# Patient Record
Sex: FEMALE | Race: OTHER | Employment: FULL TIME | URBAN - METROPOLITAN AREA
[De-identification: names, ages, dates, MRNs, and addresses within clinical notes are randomized per-mention and may not be internally consistent; named-entity substitution may affect disease eponyms.]

---
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
## 2025-01-24 LAB
EXTERNAL CHLAMYDIA SCREEN: NEGATIVE
EXTERNAL GONORRHEA SCREEN: NEGATIVE
EXTERNAL RUBELLA IGG QUANTITATION: POSITIVE
HEPATITIS C VIRUS AB PRESENCE IN SERUM EXTERNAL: NONREACTIVE
HIV 1/ 2 AG/AB SCREEN EXTERNAL: NORMAL

## 2025-01-27 ENCOUNTER — TRANSCRIBE ORDERS (OUTPATIENT)
Dept: ADMINISTRATIVE | Age: 20
End: 2025-01-27

## 2025-01-27 DIAGNOSIS — O30.002: Primary | ICD-10-CM

## 2025-02-12 ENCOUNTER — HOSPITAL ENCOUNTER (OUTPATIENT)
Dept: RADIOLOGY | Facility: CLINIC | Age: 20
Discharge: HOME | End: 2025-02-12

## 2025-02-12 DIAGNOSIS — O30.002 TWIN GESTATION WITH UNKNOWN NUMBER OF PLACENTAS AND AMNIOTIC SACS IN SECOND TRIMESTER (HHS-HCC): ICD-10-CM

## 2025-02-12 PROCEDURE — 76811 OB US DETAILED SNGL FETUS: CPT

## 2025-02-12 PROCEDURE — 76817 TRANSVAGINAL US OBSTETRIC: CPT

## 2025-02-22 ENCOUNTER — DOCUMENTATION (OUTPATIENT)
Dept: OBSTETRICS AND GYNECOLOGY | Facility: HOSPITAL | Age: 20
End: 2025-02-22

## 2025-02-22 PROBLEM — Z3A.21 21 WEEKS GESTATION OF PREGNANCY (HHS-HCC): Status: ACTIVE | Noted: 2025-02-22

## 2025-02-22 PROBLEM — O99.342 DEPRESSION AFFECTING PREGNANCY IN SECOND TRIMESTER, ANTEPARTUM (HHS-HCC): Status: ACTIVE | Noted: 2025-02-22

## 2025-02-22 PROBLEM — F32.A DEPRESSION AFFECTING PREGNANCY IN SECOND TRIMESTER, ANTEPARTUM (HHS-HCC): Status: ACTIVE | Noted: 2025-02-22

## 2025-02-22 PROBLEM — Z3A.20 20 WEEKS GESTATION OF PREGNANCY (HHS-HCC): Status: ACTIVE | Noted: 2025-02-22

## 2025-02-22 PROBLEM — O30.032 MONOCHORIONIC DIAMNIOTIC TWIN GESTATION IN SECOND TRIMESTER (HHS-HCC): Status: ACTIVE | Noted: 2025-02-22

## 2025-02-22 NOTE — PROGRESS NOTES
Pregnancy episode created and dating entered.     Camille Neumann MD, PGY 5  Maternal Fetal Medicine

## 2025-02-22 NOTE — PROGRESS NOTES
2025   Suly Olson     [facility] CONSULT NOTE  Referring Clinician: [facility]   Reason for consult: mo-di twins    HPI: Suly Olson is a 19 y.o.  at 21w3d here for consult for mo-di twins.     She reports that she is feeling well.  She presented to prenatal care around 16 weeks gestation.  She has previously had a first trimester ultrasound with GISSELLE given of 2025.  She reports that she is not taking ASA.     She denies any contractions, bleeding, LOF.  She reports normal fetal movement x 2.    Other pregnancy complications include   Patient Active Problem List   Diagnosis    21 weeks gestation of pregnancy ([facility])    Monochorionic diamniotic twin gestation in second trimester ([facility])    Depression affecting pregnancy in second trimester, antepartum ([facility])       10 point review of system is negative except as above    OB History          1    Para        Term                AB        Living             SAB        IAB        Ectopic        Multiple        Live Births                       Past medical history: Denies HTN, DM, asthma, or thyroid issues    No past surgical history on file.    Medications:   No current outpatient medications on file prior to visit.     No current facility-administered medications on file prior to visit.       No Known Allergies    Social History     Tobacco Use    Smoking status: Never    Smokeless tobacco: Never   Vaping Use    Vaping status: Never Used   Substance Use Topics    Alcohol use: Never    Drug use: Never       family history includes Thyroid disease in her mother.      OBJECTIVE  Visit Vitals  /67   Pulse 97   Wt 71.4 kg (157 lb 8 oz)   OB Status Pregnant   Smoking Status Never       Physical exam  General - in no acute distress, resting comfortably  CV - regular rate   Resp - non labored on room air, normal effort   Abd - gravid, soft, no obvious masses   Skin - no rashes   FHT: Obtained on ultrasound  today    ASSESSMENT & PLAN    Suly Olson is a 19 y.o.  at 21w3d here for the following:    Assessment & Plan  Monochorionic diamniotic twin gestation in second trimester ([facility])  Counseling:   We discussed the implication of monochorionic-diamniotic twin gestation.  We went over expected weight gain with a twin gestation and discussed that we would recommend against routine bedrest.  We discussed that, generally, multiple gestations are associated with an increased risk of most obstetric complications including of pre-term labor, PPROM, pre-eclampsia, gestational diabetes, growth restriction and  delivery.  We discussed that the majority of twins (when all twins are considered more generally) deliver  with the average being 35 weeks gestation.  We reviewed that low dose aspirin is recommended for prevention of preeclampsia. We reviewed the risks of aneuploidy and recommendations for genetic screening. Regarding delivery, we discussed our recommendation for delivery in the 36 weeks if other complications have not arisen due to the increased risk of stillbirth after that gestational age.  We reviewed the preferred route of delivery for twin gestation is a vaginal delivery though this is dependent on fetal presentation.  We reviewed that if Baby A was breech we would recommend a  section and if both fetuses are vertex we would recommend against a  section.  We discussed the option of breech extraction for Baby B if Baby A was vertex and Baby B was not and that in a large randomized study of attempted vaginal vs planned  delivery of twins between 32 and 39 weeks there was no significant decrease or increase in the risk of fetal or  death or serious  morbidity between the groups.  However, we did note that the option of breech extraction may depend on the comfort of both the patient and her provider at the time of delivery.    In regard to  monochorionic-diamniotic gestations we reviewed the risk of twin-twin transfusion syndrome in approximately 10% of pregnancies and need for close surveillance with biweekly assessment starting at 16 weeks. Given these heightened risks I did discuss that I would recommend management by maternal fetal medicine for prenatal care.    Recommendations:  -Genetic screening discussed and offered. Genetics referral placed   -Fetal echos to be performed. Appointment scheduled for 3/10  -TTTS screening every 2 weeks. US today WNL  -Growth ultrasounds q4 weeks   21 weeks gestation of pregnancy ([facility])  -Up-to-date on prenatal care  -Discussed need for GDM screening at next visit    Thank you for allowing us to participate in the care of your patient. Given her medical complexity we will transfer her care to our service.    RTC in 4 weeks     Patient was seen and evaluated with Dr. Travis.    Camille Neumann MD   Maternal Fetal Medicine

## 2025-02-22 NOTE — ASSESSMENT & PLAN NOTE
Counseling:   We discussed the implication of monochorionic-diamniotic twin gestation.  We went over expected weight gain with a twin gestation and discussed that we would recommend against routine bedrest.  We discussed that, generally, multiple gestations are associated with an increased risk of most obstetric complications including of pre-term labor, PPROM, pre-eclampsia, gestational diabetes, growth restriction and  delivery.  We discussed that the majority of twins (when all twins are considered more generally) deliver  with the average being 35 weeks gestation.  We reviewed that low dose aspirin is recommended for prevention of preeclampsia. We reviewed the risks of aneuploidy and recommendations for genetic screening. Regarding delivery, we discussed our recommendation for delivery in the 36 weeks if other complications have not arisen due to the increased risk of stillbirth after that gestational age.  We reviewed the preferred route of delivery for twin gestation is a vaginal delivery though this is dependent on fetal presentation.  We reviewed that if Baby A was breech we would recommend a  section and if both fetuses are vertex we would recommend against a  section.  We discussed the option of breech extraction for Baby B if Baby A was vertex and Baby B was not and that in a large randomized study of attempted vaginal vs planned  delivery of twins between 32 and 39 weeks there was no significant decrease or increase in the risk of fetal or  death or serious  morbidity between the groups.  However, we did note that the option of breech extraction may depend on the comfort of both the patient and her provider at the time of delivery.    In regard to monochorionic-diamniotic gestations we reviewed the risk of twin-twin transfusion syndrome in approximately 10% of pregnancies and need for close surveillance with biweekly assessment starting at 16 weeks.  Given these heightened risks I did discuss that I would recommend management by maternal fetal medicine for prenatal care.    Recommendations:  -Genetic screening discussed and offered. Genetics referral placed   -Fetal echos to be performed. Appointment scheduled for 3/10  -TTTS screening every 2 weeks. US today WNL  -Growth ultrasounds q4 weeks

## 2025-02-26 ENCOUNTER — HOSPITAL ENCOUNTER (OUTPATIENT)
Dept: RADIOLOGY | Facility: CLINIC | Age: 20
Discharge: HOME | End: 2025-02-26

## 2025-02-26 ENCOUNTER — INITIAL PRENATAL (OUTPATIENT)
Dept: MATERNAL FETAL MEDICINE | Facility: CLINIC | Age: 20
End: 2025-02-26

## 2025-02-26 ENCOUNTER — TELEPHONE (OUTPATIENT)
Dept: GENETICS | Facility: CLINIC | Age: 20
End: 2025-02-26

## 2025-02-26 VITALS — WEIGHT: 157.5 LBS | HEART RATE: 97 BPM | DIASTOLIC BLOOD PRESSURE: 67 MMHG | SYSTOLIC BLOOD PRESSURE: 111 MMHG

## 2025-02-26 DIAGNOSIS — O30.032 MONOCHORIONIC DIAMNIOTIC TWIN GESTATION IN SECOND TRIMESTER (HHS-HCC): ICD-10-CM

## 2025-02-26 DIAGNOSIS — Z3A.21 21 WEEKS GESTATION OF PREGNANCY (HHS-HCC): ICD-10-CM

## 2025-02-26 DIAGNOSIS — O30.002 TWIN GESTATION WITH UNKNOWN NUMBER OF PLACENTAS AND AMNIOTIC SACS IN SECOND TRIMESTER (HHS-HCC): ICD-10-CM

## 2025-02-26 DIAGNOSIS — O30.032 MONOCHORIONIC DIAMNIOTIC TWIN GESTATION IN SECOND TRIMESTER (HHS-HCC): Primary | ICD-10-CM

## 2025-02-26 PROCEDURE — 99214 OFFICE O/P EST MOD 30 MIN: CPT | Mod: GC | Performed by: STUDENT IN AN ORGANIZED HEALTH CARE EDUCATION/TRAINING PROGRAM

## 2025-02-26 PROCEDURE — 99204 OFFICE O/P NEW MOD 45 MIN: CPT | Performed by: STUDENT IN AN ORGANIZED HEALTH CARE EDUCATION/TRAINING PROGRAM

## 2025-02-26 PROCEDURE — 76816 OB US FOLLOW-UP PER FETUS: CPT

## 2025-02-26 PROCEDURE — 76821 MIDDLE CEREBRAL ARTERY ECHO: CPT

## 2025-02-26 ASSESSMENT — PAIN - FUNCTIONAL ASSESSMENT: PAIN_FUNCTIONAL_ASSESSMENT: 0-10

## 2025-02-26 ASSESSMENT — EDINBURGH POSTNATAL DEPRESSION SCALE (EPDS)
I HAVE BEEN SO UNHAPPY THAT I HAVE HAD DIFFICULTY SLEEPING: NOT AT ALL
THINGS HAVE BEEN GETTING ON TOP OF ME: NO, I HAVE BEEN COPING AS WELL AS EVER
I HAVE BEEN SO UNHAPPY THAT I HAVE BEEN CRYING: NO, NEVER
I HAVE BLAMED MYSELF UNNECESSARILY WHEN THINGS WENT WRONG: NO, NEVER
I HAVE LOOKED FORWARD WITH ENJOYMENT TO THINGS: AS MUCH AS I EVER DID
TOTAL SCORE: 0
I HAVE FELT SAD OR MISERABLE: NO, NOT AT ALL
I HAVE FELT SCARED OR PANICKY FOR NO GOOD REASON: NO, NOT AT ALL
I HAVE BEEN ABLE TO LAUGH AND SEE THE FUNNY SIDE OF THINGS: AS MUCH AS I ALWAYS COULD
THE THOUGHT OF HARMING MYSELF HAS OCCURRED TO ME: NEVER
I HAVE BEEN ANXIOUS OR WORRIED FOR NO GOOD REASON: NO, NOT AT ALL

## 2025-02-26 ASSESSMENT — PATIENT HEALTH QUESTIONNAIRE - PHQ9
2. FEELING DOWN, DEPRESSED OR HOPELESS: NOT AT ALL
SUM OF ALL RESPONSES TO PHQ9 QUESTIONS 1 AND 2: 0
1. LITTLE INTEREST OR PLEASURE IN DOING THINGS: NOT AT ALL

## 2025-03-10 ENCOUNTER — HOSPITAL ENCOUNTER (OUTPATIENT)
Dept: PEDIATRIC CARDIOLOGY | Facility: HOSPITAL | Age: 20
Discharge: HOME | End: 2025-03-10

## 2025-03-10 ENCOUNTER — HOSPITAL ENCOUNTER (OUTPATIENT)
Dept: RADIOLOGY | Facility: CLINIC | Age: 20
Discharge: HOME | End: 2025-03-10

## 2025-03-10 ENCOUNTER — OFFICE VISIT (OUTPATIENT)
Dept: PEDIATRIC CARDIOLOGY | Facility: HOSPITAL | Age: 20
End: 2025-03-10

## 2025-03-10 VITALS
HEIGHT: 62 IN | HEART RATE: 96 BPM | DIASTOLIC BLOOD PRESSURE: 75 MMHG | BODY MASS INDEX: 28.8 KG/M2 | OXYGEN SATURATION: 99 % | SYSTOLIC BLOOD PRESSURE: 112 MMHG | WEIGHT: 156.53 LBS

## 2025-03-10 DIAGNOSIS — O35.9XX1 SUSPECTED FETAL ANOMALY, ANTEPARTUM, FETUS 1 (HHS-HCC): ICD-10-CM

## 2025-03-10 DIAGNOSIS — O35.8XX0 MATERNAL CARE FOR OTHER (SUSPECTED) FETAL ABNORMALITY AND DAMAGE, NOT APPLICABLE OR UNSPECIFIED (HHS-HCC): ICD-10-CM

## 2025-03-10 DIAGNOSIS — O30.002 TWIN GESTATION WITH UNKNOWN NUMBER OF PLACENTAS AND AMNIOTIC SACS IN SECOND TRIMESTER (HHS-HCC): ICD-10-CM

## 2025-03-10 DIAGNOSIS — O35.9XX2: ICD-10-CM

## 2025-03-10 DIAGNOSIS — O30.039 TWIN PREGNANCY, MONOCHORIONIC/DIAMNIOTIC, UNSPECIFIED TRIMESTER (HHS-HCC): ICD-10-CM

## 2025-03-10 DIAGNOSIS — O35.9XX1 SUSPECTED FETAL ABNORMALITY AFFECTING MANAGEMENT OF MOTHER, FETUS 1 OF MULTIPLE GESTATION (HHS-HCC): ICD-10-CM

## 2025-03-10 DIAGNOSIS — O30.032 MONOCHORIONIC DIAMNIOTIC TWIN GESTATION IN SECOND TRIMESTER (HHS-HCC): Primary | ICD-10-CM

## 2025-03-10 PROCEDURE — 99214 OFFICE O/P EST MOD 30 MIN: CPT | Mod: 25 | Performed by: PEDIATRICS

## 2025-03-10 PROCEDURE — 1036F TOBACCO NON-USER: CPT | Performed by: PEDIATRICS

## 2025-03-10 PROCEDURE — 76816 OB US FOLLOW-UP PER FETUS: CPT

## 2025-03-10 PROCEDURE — 76821 MIDDLE CEREBRAL ARTERY ECHO: CPT | Performed by: OBSTETRICS & GYNECOLOGY

## 2025-03-10 PROCEDURE — 76816 OB US FOLLOW-UP PER FETUS: CPT | Performed by: OBSTETRICS & GYNECOLOGY

## 2025-03-10 PROCEDURE — 99204 OFFICE O/P NEW MOD 45 MIN: CPT | Performed by: PEDIATRICS

## 2025-03-10 PROCEDURE — 3008F BODY MASS INDEX DOCD: CPT | Performed by: PEDIATRICS

## 2025-03-10 NOTE — PROGRESS NOTES
[facility]  [facility]  Division of Pediatric Cardiology  [facility]  [address], [place] [address]  Tel: [phone], Fax [phone]      Obstetrician: Dr. Camille Neumann/ Dr. Koko Travis     Suly Olson was seen at the request of  Dr. Sasha Almaraz  for  monochorionic diamniotic twin gestation .  Records were reviewed, and a summary of those records is integrated within the history of present illness.  A report with my findings is being sent via written or electronic means to the referring physician with my recommendations.    Accompanied by: significant other  History obtained from: patient    History of Presentation   History of Present Illness:   Suly Olson is a 19 y.o. female presenting for initial prenatal cardiology consultation and fetal echocardiogram for  monochorionic diamniotic twin gestation .  She is  and approximately 23w1d weeks pregnant (No LMP recorded. Patient is pregnant., Estimated Date of Delivery: 25) with two Male fetuses.  Her obstetric history is significant for first pregnancy.  Complications of her current pregnancy include  twin gestation .  Ms. Suly Olson had a normal first trimester screen.  Her level 2 obstetric ultrasound for anatomy was performed at 19 weeks gestational age and demonstrated incomplete cardiac views for Twin A and Twin B. In addition there was concern for a discrepancy in pulmonary artery to aorta size in Twin A. Her obstetric ultrasound at 21 weeks was completed and no malformations were identified with reassuring cardiac views.  She has not undergone cell free fetal DNA testing.  She has not had invasive genetic testing such as amniocentesis or chorionic villous sampling.  This pregnancy was not the result of in vitro fertilization.  She was not using potentially teratogenic medication at the time of  "conception.  There have been no other complications of this pregnancy.  Ms. Suly Olson plans to deliver her baby at [facility].    Ms. Suly Olson feels well today.  She denies any shortness of breath, abdominal cramping, contractions, bleeding, or swelling of the extremities.  She notes frequent fetal movement.      Medical History     Medical Conditions:  Patient Active Problem List   Diagnosis    21 weeks gestation of pregnancy ([facility])    Monochorionic diamniotic twin gestation in second trimester ([facility])    Depression affecting pregnancy in second trimester, antepartum ([facility])     Past Surgeries:  No past surgical history on file.    No current outpatient medications   Allergies:  Patient has no known allergies.    Social History:  Patient lives with significant other.  Occupation: None   Smoking:  Yes   Alcohol: None  Drug Use: None  She wears a seatbelt while in the car. She denies any verbal, sexual or physical abuse.     Cardiac Family History (for patient and father of baby):  She has a brother who  from SIDS.  There is no history of congenital heart disease.  There is no history of early sudden drowning.  There is no history of cardiomyopathy of any type or heart transplant.  There is no history of arrhythmias/pacemaker/defibrillator or arrhythmia syndromes, including Long QT syndrome, Ghanshyam-Parkinson-White syndrome or Brugada syndrome.  There is no history of heart attack or stroke before the age of 55 years in a close family member.  There is no history of Marfan syndrome or aortic aneurysm.  There is no history of deafness.  There is no history of syncope/fainting.  There is no history of high blood pressure or high cholesterol.  There is no history of DiGeorge Syndrome (22q11).    Physical Examination     /75 (BP Location: Left arm, Patient Position: Sitting, BP Cuff Size: Adult)   Pulse 96   Ht 1.576 m (5' 2.05\")   Wt 71 kg (156 lb 8.4 oz)   SpO2 99%   BMI " 28.59 kg/m²     General: Alert, well-appearing and in no acute distress.    Abdomen: Soft, nontender, not distended. Gravid.  Extremities: No swelling or edema.  Neurologic / Psychiatric: Grossly intact without focal deficits.  Appropriate demeanor.      Results     Fetal Echocardiogram:    I ordered and interpreted a transabdominal fetal echocardiogram.  I performed a portion of the study myself.  The complete report is available under separate cover.  The results are summarized as follows:    Fetus A:  Image quality: Good  Cardiac situs: Cardiac mass in the left chest.  Left ventricular apex points leftward.  Segmental anatomy: Atrio-ventricular concordance.  Ventriculo-arterial concordance.  Normally-related great arteries.  Superior vena cava: Normal connection to the right atrium.  Inferior vena cava: Normal connection to the right atrium.  Pulmonary veins: At least one pulmonary vein connects normally to the left atrium.  Atrial septum: Patent foramen ovale, with flap valve bowing into the left atrium.  Tricuspid valve: Structurally normal.  No obvious stenosis or insufficiency.  Mitral valve: Structurally normal.  No obvious stenosis or insufficiency.  Right ventricle: Normal ventricular size, wall thickness, and systolic function (qualitative).  Left ventricle: Normal ventricular size, wall thickness, and systolic function (qualitative).  Interventricular septum: No obvious septal defect.  Aortic valve: Structurally normal.  No obvious stenosis or insufficiency.  Pulmonary valve: Structurally normal.  No obvious stenosis or insufficiency.  Pulmonary artery: Normal in size.  Ductal arch: Patent with right to left shunting.  Aortic arch: Left-sided.  Patent.  Pericardial effusion: None.  Umbilical arteries: Two umbilical arteries.  Normal arterial flow pattern.  Umbilical vein: Normal venous flow pattern.  Electrophysiology: Normal fetal heart rate and rhythm.  Normal mechanical MA interval.    Fetus A:  Image  quality: Good  Cardiac situs: Cardiac mass in the left chest.  Left ventricular apex points leftward.  Segmental anatomy: Atrio-ventricular concordance.  Ventriculo-arterial concordance.  Normally-related great arteries.  Superior vena cava: Normal connection to the right atrium.  Inferior vena cava: Normal connection to the right atrium.  Pulmonary veins: At least one pulmonary vein connects normally to the left atrium.  Atrial septum: Patent foramen ovale, with flap valve bowing into the left atrium.  Tricuspid valve: Structurally normal.  No obvious stenosis or insufficiency.  Mitral valve: Structurally normal.  No obvious stenosis or insufficiency.  Right ventricle: Normal ventricular size, wall thickness, and systolic function (qualitative).  Left ventricle: Normal ventricular size, wall thickness, and systolic function (qualitative).  Interventricular septum: No obvious septal defect.  Aortic valve: Structurally normal.  No obvious stenosis or insufficiency.  Pulmonary valve: Structurally normal.  No obvious stenosis or insufficiency.  Pulmonary artery: Normal in size.  Ductal arch: Patent with right to left shunting.  Aortic arch: Left-sided.  Patent.  Pericardial effusion: None.  Umbilical arteries: Two umbilical arteries.  Normal arterial flow pattern.  Umbilical vein: Normal venous flow pattern.  Electrophysiology: Normal fetal heart rate and rhythm.  Normal mechanical MO interval.    Assessment & Plan   Assessment:  Ms. Suly Olson is a 19 y.o. female who is  and currently at 23w1d weeks gestational age with twin male fetuses.  A fetal echocardiogram was performed today for  monochorionic diamniotic twin gestation .     Fetal echocardiogram today demonstrated grossly normal fetal cardiac anatomy, qualitatively normal fetal cardiac function, normal fetal heart rhythm, and no evidence of in utero congestive heart failure or hydrops fetalis for both fetus A and B.  I reviewed the results of today's  evaluation, including the findings of the fetal echocardiogram, with Ms. Suly Olson in detail.      I discussed limitations of the technology of fetal echocardiography with Ms. Suly Olson in detail.  I explained that fetal echocardiography cannot exclude all forms of congenital heart disease.  Fetal echocardiography may be insensitive to some defects of atrial and ventricular septation, minor valvular abnormalities, partial anomalous pulmonary venous return, and coarctation of the aorta.  In addition, normal fetal echocardiogram does not ensure that the fetal ductus arteriosus or foramen ovale will close.      Recommendations:  No changes to prenatal care.    Further fetal cardiac imaging is not required at this time.  We would be happy to see Ms. Suly Olson in the future if new concerns arise.    Fetus A and B:  Triage code 0:        No changes to delivery planning.  Delivery per obstetrics at patient´s preferred hospital.  Standard  care per  team.        No pediatric cardiology consult needed after birth unless there are concerns/issues.    I spent greater than 60 minutes in performance of this consultation, of which greater than 50% was related to coordination of care or counseling.    This assessment and plan, in addition to the results of relevant testing were explained to Suly. All questions were answered and understanding was demonstrated.    It was a pleasure to see Ms. Suly Olson today.  If you have any questions or concerns regarding this evaluation, do not hesitate to contact me.      Vane Shah MD, FACC, FAAP   of Pediatrics  Division of Pediatric Cardiology  [place] [address]  Phone: [phone]  Fax: [phone]  e-mail: [email or web address]

## 2025-03-10 NOTE — PATIENT INSTRUCTIONS
On fetal echocardiogram today the structure, size, function (squeeze), and rhythm of both of your fetuses' hearts were normal.  There are some limitations to fetal echocardiogram as described below, and because it is not a perfect test it is important to know that we cannot rule out all possible heart problems (see below).  We will communicate these results with your obstetrician.    It was a pleasure to see you today.  We do not need to see you back for routine follow-up during the remainder of your pregnancy.  However, we would be happy to see you back if new issues or concerns arise.  After birth your babies crocker not need to see a cardiologist unless the pediatric team has concerns.  If you have any questions or concerns regarding this evaluation, please do not hesitate to contact me.    Vane Shah MD   of Pediatrics  Division of Pediatric Cardiology  [place] [address]  Phone: [phone]  Fax: [phone]  e-mail: [email or web address]    [email or web address]    Normal Fetal Echocardiogram    Today you had an ultrasound of your fetus' heart (fetal echocardiogram).  Based on all of the pictures taken today, the heart appears normal and is developing as expected for this point in your pregnancy.   Therefore, no further testing is recommended at this time.    Despite today´s normal findings, it is impossible to rule out all heart problems before birth.  This is partially because the fetus´ heart is so small, and our machine´s technology can only see structures down to a certain size.  It is also because blood flow in a fetus is different and more complicated than blood flow after birth.    Briefly:  ° Fetuses get oxygen from the placenta instead of their lungs.  High-oxygen (red) blood from the placenta comes back to the right side of the  fetus´ heart.  ° Red blood crosses to the left side of the heart through a hole between the upper chambers of the heart, the foramen ovale.  The foramen ovale lets the red blood flow to the body.  ° Low-oxygen (blue) blood stays on the right side of the heart.  After leaving the heart, the blue blood flows through a special blood vessel known as the ductus arteriosus.  The ductus arteriosus allows the blue blood to bypass the lungs and return to the placenta to get oxygen.  ° After birth the foramen ovale and ductus arteriosus should close, but sometimes they do not.  It is impossible to predict in which children these structures will remain open.    ° Thus, on fetal echo we cannot rule out that your baby will have a patent foramen ovale (PFO) or patent ductus arteriosus (PDA) after birth.    In addition, fetal echocardiography can miss other heart defects including:  ° Small or medium-sized holes between the upper or lower heart chambers.  ° Minor abnormalities of the heart valves.  ° Narrowing of the main artery that goes to the body (coarctation of the aorta).  ° Other rare abnormalities of the veins or arteries.    If any of these defects are present, there should be findings after birth that will alert your child´s doctor that there is a problem and prompt further evaluation.  After delivery, if your pediatrician has any concerns, your child's heart can be reevaluated at that time.

## 2025-03-10 NOTE — LETTER
March 10, 2025     Sasha Almaraz MD   Robbie Ferrer  [facility] [address]  [facility] [address]    Patient: Suly Olson   YOB: 2005   Date of Visit: 3/10/2025       Dear Dr. Sasha Almaraz MD:    Thank you for referring Suly Olson to me for evaluation. Below are my notes for this consultation.  If you have questions, please do not hesitate to call me. I look forward to following your patient along with you.       Sincerely,     Vane Shah MD      CC: MD Marley Rayo, APRN-CNP  Isabel Navarro, APRROBERT-CN, DNP  ______________________________________________________________________________________         [facility]  [facility]  Division of Pediatric Cardiology  [facility]  [address] [place] [address], [address] [place] [address]  Tel: [phone], Fax [phone]      Obstetrician: Dr. Camille Neumann/ Dr. Koko Travis     Suly Olson was seen at the request of  Dr. Sasha Almaraz  for  monochorionic diamniotic twin gestation .  Records were reviewed, and a summary of those records is integrated within the history of present illness.  A report with my findings is being sent via written or electronic means to the referring physician with my recommendations.    Accompanied by: significant other  History obtained from: patient    History of Presentation   History of Present Illness:   Suly Olson is a 19 y.o. female presenting for initial prenatal cardiology consultation and fetal echocardiogram for  monochorionic diamniotic twin gestation .  She is  and approximately 23w1d weeks pregnant (No LMP recorded. Patient is pregnant., Estimated Date of Delivery: 25) with two Male fetuses.  Her obstetric history is significant for first pregnancy.  Complications of her current pregnancy include  twin gestation .  Ms. Suly Olson had a normal  first trimester screen.  Her level 2 obstetric ultrasound for anatomy was performed at 19 weeks gestational age and demonstrated incomplete cardiac views for Twin A and Twin B. In addition there was concern for a discrepancy in pulmonary artery to aorta size in Twin A. Her obstetric ultrasound at 21 weeks was completed and no malformations were identified with reassuring cardiac views.  She has not undergone cell free fetal DNA testing.  She has not had invasive genetic testing such as amniocentesis or chorionic villous sampling.  This pregnancy was not the result of in vitro fertilization.  She was not using potentially teratogenic medication at the time of conception.  There have been no other complications of this pregnancy.  Ms. Suly Olson plans to deliver her baby at [facility].    Ms. Suly Olson feels well today.  She denies any shortness of breath, abdominal cramping, contractions, bleeding, or swelling of the extremities.  She notes frequent fetal movement.      Medical History     Medical Conditions:  Patient Active Problem List   Diagnosis   • 21 weeks gestation of pregnancy ([facility])   • Monochorionic diamniotic twin gestation in second trimester ([facility])   • Depression affecting pregnancy in second trimester, antepartum ([facility])     Past Surgeries:  No past surgical history on file.    No current outpatient medications   Allergies:  Patient has no known allergies.    Social History:  Patient lives with significant other.  Occupation: None   Smoking:  Yes   Alcohol: None  Drug Use: None  She wears a seatbelt while in the car. She denies any verbal, sexual or physical abuse.     Cardiac Family History (for patient and father of baby):  She has a brother who  from SIDS.  There is no history of congenital heart disease.  There is no history of early sudden drowning.  There is no history of cardiomyopathy of any type or heart transplant.  There is no history of  "arrhythmias/pacemaker/defibrillator or arrhythmia syndromes, including Long QT syndrome, Ghanshyam-Parkinson-White syndrome or Brugada syndrome.  There is no history of heart attack or stroke before the age of 55 years in a close family member.  There is no history of Marfan syndrome or aortic aneurysm.  There is no history of deafness.  There is no history of syncope/fainting.  There is no history of high blood pressure or high cholesterol.  There is no history of DiGeorge Syndrome (22q11).    Physical Examination     /75 (BP Location: Left arm, Patient Position: Sitting, BP Cuff Size: Adult)   Pulse 96   Ht 1.576 m (5' 2.05\")   Wt 71 kg (156 lb 8.4 oz)   SpO2 99%   BMI 28.59 kg/m²     General: Alert, well-appearing and in no acute distress.    Abdomen: Soft, nontender, not distended. Gravid.  Extremities: No swelling or edema.  Neurologic / Psychiatric: Grossly intact without focal deficits.  Appropriate demeanor.      Results     Fetal Echocardiogram:    I ordered and interpreted a transabdominal fetal echocardiogram.  I performed a portion of the study myself.  The complete report is available under separate cover.  The results are summarized as follows:    Fetus A:  Image quality: Good  Cardiac situs: Cardiac mass in the left chest.  Left ventricular apex points leftward.  Segmental anatomy: Atrio-ventricular concordance.  Ventriculo-arterial concordance.  Normally-related great arteries.  Superior vena cava: Normal connection to the right atrium.  Inferior vena cava: Normal connection to the right atrium.  Pulmonary veins: At least one pulmonary vein connects normally to the left atrium.  Atrial septum: Patent foramen ovale, with flap valve bowing into the left atrium.  Tricuspid valve: Structurally normal.  No obvious stenosis or insufficiency.  Mitral valve: Structurally normal.  No obvious stenosis or insufficiency.  Right ventricle: Normal ventricular size, wall thickness, and systolic function " (qualitative).  Left ventricle: Normal ventricular size, wall thickness, and systolic function (qualitative).  Interventricular septum: No obvious septal defect.  Aortic valve: Structurally normal.  No obvious stenosis or insufficiency.  Pulmonary valve: Structurally normal.  No obvious stenosis or insufficiency.  Pulmonary artery: Normal in size.  Ductal arch: Patent with right to left shunting.  Aortic arch: Left-sided.  Patent.  Pericardial effusion: None.  Umbilical arteries: Two umbilical arteries.  Normal arterial flow pattern.  Umbilical vein: Normal venous flow pattern.  Electrophysiology: Normal fetal heart rate and rhythm.  Normal mechanical SC interval.    Fetus A:  Image quality: Good  Cardiac situs: Cardiac mass in the left chest.  Left ventricular apex points leftward.  Segmental anatomy: Atrio-ventricular concordance.  Ventriculo-arterial concordance.  Normally-related great arteries.  Superior vena cava: Normal connection to the right atrium.  Inferior vena cava: Normal connection to the right atrium.  Pulmonary veins: At least one pulmonary vein connects normally to the left atrium.  Atrial septum: Patent foramen ovale, with flap valve bowing into the left atrium.  Tricuspid valve: Structurally normal.  No obvious stenosis or insufficiency.  Mitral valve: Structurally normal.  No obvious stenosis or insufficiency.  Right ventricle: Normal ventricular size, wall thickness, and systolic function (qualitative).  Left ventricle: Normal ventricular size, wall thickness, and systolic function (qualitative).  Interventricular septum: No obvious septal defect.  Aortic valve: Structurally normal.  No obvious stenosis or insufficiency.  Pulmonary valve: Structurally normal.  No obvious stenosis or insufficiency.  Pulmonary artery: Normal in size.  Ductal arch: Patent with right to left shunting.  Aortic arch: Left-sided.  Patent.  Pericardial effusion: None.  Umbilical arteries: Two umbilical arteries.   Normal arterial flow pattern.  Umbilical vein: Normal venous flow pattern.  Electrophysiology: Normal fetal heart rate and rhythm.  Normal mechanical NV interval.    Assessment & Plan   Assessment:  Ms. Suly Olson is a 19 y.o. female who is  and currently at 23w1d weeks gestational age with twin male fetuses.  A fetal echocardiogram was performed today for  monochorionic diamniotic twin gestation .     Fetal echocardiogram today demonstrated grossly normal fetal cardiac anatomy, qualitatively normal fetal cardiac function, normal fetal heart rhythm, and no evidence of in utero congestive heart failure or hydrops fetalis for both fetus A and B.  I reviewed the results of today's evaluation, including the findings of the fetal echocardiogram, with Ms. Suly Olson in detail.      I discussed limitations of the technology of fetal echocardiography with Ms. Suly Olson in detail.  I explained that fetal echocardiography cannot exclude all forms of congenital heart disease.  Fetal echocardiography may be insensitive to some defects of atrial and ventricular septation, minor valvular abnormalities, partial anomalous pulmonary venous return, and coarctation of the aorta.  In addition, normal fetal echocardiogram does not ensure that the fetal ductus arteriosus or foramen ovale will close.      Recommendations:  No changes to prenatal care.    Further fetal cardiac imaging is not required at this time.  We would be happy to see Ms. Suly Olson in the future if new concerns arise.    Fetus A and B:  Triage code 0:        No changes to delivery planning.  Delivery per obstetrics at patient´s preferred hospital.  Standard  care per  team.        No pediatric cardiology consult needed after birth unless there are concerns/issues.    I spent greater than 60 minutes in performance of this consultation, of which greater than 50% was related to coordination of care or counseling.    This assessment and plan,  in addition to the results of relevant testing were explained to Suly. All questions were answered and understanding was demonstrated.    It was a pleasure to see Ms. Suly Olson today.  If you have any questions or concerns regarding this evaluation, do not hesitate to contact me.      Vane Shah MD, FACC, FAAP   of Pediatrics  Division of Pediatric Cardiology  [place] [address]  Phone: [phone]  Fax: [phone]  e-mail: [email or web address]

## 2025-03-18 ENCOUNTER — CLINICAL SUPPORT (OUTPATIENT)
Dept: GENETICS | Facility: HOSPITAL | Age: 20
End: 2025-03-18

## 2025-03-18 VITALS
DIASTOLIC BLOOD PRESSURE: 68 MMHG | SYSTOLIC BLOOD PRESSURE: 112 MMHG | HEART RATE: 105 BPM | BODY MASS INDEX: 28.89 KG/M2 | WEIGHT: 153 LBS | HEIGHT: 61 IN

## 2025-03-18 DIAGNOSIS — O30.032 MONOCHORIONIC DIAMNIOTIC TWIN GESTATION IN SECOND TRIMESTER (HHS-HCC): ICD-10-CM

## 2025-03-18 DIAGNOSIS — Z13.79 GENETIC SCREENING: ICD-10-CM

## 2025-03-18 PROCEDURE — GENMD PR GENETICS VISIT (MEDICAID/MEDICARE): Performed by: GENETIC COUNSELOR, MS

## 2025-03-25 PROBLEM — Z3A.25 25 WEEKS GESTATION OF PREGNANCY (HHS-HCC): Status: ACTIVE | Noted: 2025-02-22

## 2025-03-25 NOTE — ASSESSMENT & PLAN NOTE
-third trimester labs ordered today. Encouraged patient to complete near 28wks gestation   -reviewed cfDNA results   -US today. Continue q2 week ultrasounds for TTTS checks   -Tdap at next visit   -Birth control - undecided. Currently in same sex relationship   -Plans to breastfeed. Will arrange breast pump  -Undecided on pediatrician     Orders:    CBC Anemia Panel With Reflex, Pregnancy; Future    Syphilis Screen with Reflex; Future    Glucose, 1 Hour Screen, Pregnancy; Future    Follow Up In Maternal Fetal Medicine; Future

## 2025-03-25 NOTE — PROGRESS NOTES
MFM Follow-up  3/26/2025   2:19 PM     SUBJECTIVE    HPI: Suly Olson is a 19 y.o.  at 25w3d  here for RPNV. Denies contractions, bleeding, or LOF. Reports normal fetal movement. She reports that she does not have an appetite though is not nauseous or having     Since last being seen in clinic, she underwent fetal echo which was normal for both twins and was seen by genetic counseling where she opted for Tab cfDNA screening. cfDNA was low risk with monozygotic twins.     OBJECTIVE    Visit Vitals  /83 Comment: 108   Wt 70.7 kg (155 lb 12.8 oz)   BMI 29.44 kg/m²   OB Status Pregnant   Smoking Status Never   BSA 1.74 m²      General - in no acute distress, resting comfortably  CV - regular rate   Resp - non labored on room air, normal effort   Abd - gravid, soft, no obvious masses   Skin - no rashes   FHT: obtained on US today    ASSESSMENT & PLAN    Suly Olson is a 19 y.o.  at 25w3d  here for the following concerns we addressed today:    Assessment & Plan  Monochorionic diamniotic twin gestation in second trimester ([facility])  - US today with negative TTTS screen   -s/p fetal echo, normal for both twins        25 weeks gestation of pregnancy ([facility])  -third trimester labs ordered today. Encouraged patient to complete near 28wks gestation   -reviewed cfDNA results   -US today. Continue q2 week ultrasounds for TTTS checks   -Tdap at next visit   -Birth control - undecided. Currently in same sex relationship   -Plans to breastfeed. Will arrange breast pump  -Undecided on pediatrician     Orders:    CBC Anemia Panel With Reflex, Pregnancy; Future    Syphilis Screen with Reflex; Future    Glucose, 1 Hour Screen, Pregnancy; Future    Follow Up In Maternal Fetal Medicine; Future      RTC in 4 weeks    Patient seen and evaluated with Dr. Ortiz.     Camille Neumann MD

## 2025-03-25 NOTE — PROGRESS NOTES
"Suly Olson is a 19 y.o. old, , female who was approximately 24 weeks and 2 days pregnant at the time of our appointment with an EDC of 25.  She was seen with her grandmother to discuss her genetic screening and testing options.    PAST HISTORY:  The patient has a personal history of depression.  The patient is currently pregnancy with a Monochorionic/diamniotic twin gestation.  The patient reported that the father of the pregnancy is currently incarcerated and she does not have contact with him.  He is not planning on being involved with this child after birth.  The patient reported that she is smoking marijuana daily.      Ultrasound 3/10/25, per report:  \"-Monochorionic/diamniotic twin gestation noted  -Normal concordant growth of both twins  -Normal limited anatomic surveys of both twins  -There is no evidence of twin to twin transfusion syndrome by comparison of, amniotic fluid volumes, and bladder sizes.  - There is no evidence of TAPS by PSV of the MCA of either twin...\"    Ultrasound 25, per report:  \"-Monochorionic/diamniotic twin gestation noted  -Fetal biometry was not repeated due to the short scan interval  -The anatomic survey was completed x2  -The 3VV for Twin A appears normal on today's exam (there was concern for a size discrepancy (A>P) at the targeted exam)  -No malformations were identified  -There is no evidence of twin to twin transfusion syndrome by comparison of amniotic fluid volumes and bladder sizes.  -There is no evidence of twin anemia/polycythemia sequence (TAPS) by comparison of MCA PSVs...\"    Ultrasound 25, per report:  \"-Monochorionic/diamniotic twin gestation noted: one placental mass is noted, the intertwin membrane appears thin without lambda sign, and fetal sex is concordant  -Normal and concordant interval fetal growth of both twins  -Detailed anatomic evaluation of the fetal brain/ventricles, face, heart/outflow tracts and chest anatomy, abdominal organ " "specific anatomy, number/length/architecture of  limbs and detailed evaluation of the umbilical cord and placenta and other fetal anatomy as clinically indicated was performed on both twins.  -No malformations were identified on this comprehensive survey within limitations of sonographic evaluation at this gestational age. There is a discrepancy in pulmonary  artery to aorta size in twin A's 3 vessel view that may be positional.  -The following views are suboptimal:  Twin A: 4 chamber heart/septum, 3VV, right diaphragm, and right fingers  Twin B: spine, aortic arch, 3VTV, SVC/IVC  -There is no evidence of twin to twin transfusion syndrome with normal amniotic fluid and bladders for each twin  -There is no evidence of twin anemia/polycythemia sequence (TAPS) by comparison of MCA PSVs...\"    Fetal Echocardiogram 3/10/25  \"Fetal echocardiogram today demonstrated grossly normal fetal cardiac anatomy, qualitatively normal fetal cardiac function, normal fetal heart rhythm, and no evidence of in utero congestive heart failure or hydrops fetalis for both fetus A and B...\"    Mother to Baby (2017).  Most studies have not found an increase in the chance for birth defects among babies whose mother's occasionally used marijuana. A few studies have shown a slight increase in the chance for gastroschisis and one study reported an increased risk for heart defects.  These studies are difficult to interpret because some mothers used other substances at the same time or had other factors that put them at risk.  Some studies have found that women who smoke marijuana regularly have an increased chance for pregnancy complications including: low birth weight, smaller babies, premature birth, stillbirth, or  death. Babies may also have withdrawal symptoms and problems with learning later on.  These problems have been reported more often with heavy marijuana use. Some studies have conflicting results. Because there are no good " studies among marijuana users, it is best to avoid marijuana during pregnancy.    The patient has not had any screening or diagnostic testing for aneuploidy in her pregnancy thus far.    FAMILY HISTORY:  Medical and family histories were reviewed and the following concerns regarding this pregnancy were apparent:      Ms. Olson:  Paternal half-sister- has a daughter with intellectual disability  Brother- , SIDS, at the age of 6 months  Mother- thyroid disease    The father of the pregnancy:  Limited/no family history information     The remainder of the family history was negative for birth defects, intellectual disability, recurrent pregnancy loss, or recognized inherited conditions.  Consanguinity was denied.  The Pedigree will be scanned in the Media tab and is available for a full review of the family history.      ANCESTRY:   The patient reported that she is of  and [place] ancestry.  She reported that the father of the pregnancy is of  ancestry.  Ashkenazi Caodaism ancestry was denied.    We discussed the availability, benefits, and limitations of carrier screening for cystic fibrosis (CF), spinal muscular atrophy (SMA), and hemoglobinopathies/thalassemias. We reviewed the carrier frequencies of these conditions, varied clinical manifestations, and their autosomal recessive inheritance. If both members of the couple are found to be carriers for the same autosomal recessive condition, there is a 25% chance for an affected child and prenatal diagnosis would be available. Negative carrier screening does not rule out the possibility of being a carrier. [place] screening is available for CF, hemoglobinopathies, and thalassemias, and SMA.  We also discussed the availability of more expanded/pan ethnic carrier screening for additional, primarily autosomal recessive, conditions. We discussed the pros and cons of expanded carrier screening including the higher likelihood of being  identified as a carrier for at least one condition on a larger panel. Approximately 4% of couples are found to be at risk to have a child with a genetic disorder based on this screening. In rare cases, expanded carrier screening results may have health implications for the tested individual.      After careful consideration, the patient declined all carrier screening.      COUNSELING:  The following information was discussed with your patient:     1. There is an increased risk for birth defects in monochorionic twin pregnancies. The risk for at least one fetus to have a birth defect is approximately 8%.      2. Chromosomes, genes, non-disjunction, and common aneuploidies. Based on the age of 20 at EDC alone, at this gestation, the risk for Down syndrome in a monochorionic twin pregnancy is approximately 1 in 1200 . The combined risk for Trisomy 21/18/13 in a monochorinic twin pregnancy is approximately 1 in 780. This does not include copy number variation, mosaicism, translocations, single gene disorders, and marker chromosomes.     3. We discussed the risk for twin to twin transfusion syndrome (TTTS). This condition can occur in monochorionic pregnancies where twins share a placenta. In this condition, blood flows unevenly between the two babies due to abnormal blood vessel connections. In TTTS, one twin becomes dehydrated and has poor growth and low amniotic fluid volume and the other twin produces too much urine and develops high blood pressure, which can lead to heart failure. Serial monitoring of the pregnancy by ultrasound is therefore recommended.     4. The availability, benefits and limitations of ultrasound study. An ultrasound study is recommended at 12-14 weeks gestation for assessment of nuchal translucency and again at 19-20 weeks gestation to survey fetal organs. An ultrasound study in the second trimester can identify 50% of Down syndrome cases and 80-90% of trisomy 18 or trisomy 13 cases.     5. The  availability, benefits and limitations of standard cell-free DNA screening.  We discussed the methodology for this screen, which includes using cell-free DNA obtained from a mother's blood (derived from the placenta) to screen for the presence of common chromosomal abnormalities. Depending on the laboratory used, there is an approximate 98% sensitivity for Down syndrome with a twin pregnancy. For singletons there is at least 97.4% sensitivity for trisomy 18, and at least 91% sensitivity for trisomy 13. We discussed that most companies report similar data for twin pregnancies with that of a smith pregnancies. The amount of twin pregnancies studied are far lower than the smith validation data. Specificity in smith pregnancies for these trisomies is >99%.Although sensitivity and specificity rates are high, in our experience the positive predictive value is dependent on many factors; whereas false negative results are rare.  In addition, anticoagulants, maternal chromosome abnormality, fibroids or malignancy may impact results and/or be associated with inconclusive or other abnormal findings.  This is not a diagnostic test.  Therefore, in the event of an abnormal result, prenatal diagnosis through amniocentesis is recommended to confirm the findings, if confirmation is desired.  Results take approximately 7-10 days.  We discussed that cell-free DNA screening does not screen for sex chromosome aneuploidy in most forms of twin pregnancies. We discussed limitations when it comes to suspected fetal sex and screening for zygosity.    6. The methods, benefits, limitations and risks of amniocentesis.  There is an approximate 1 in 400 risk of complications including a 1 in 800 risk of miscarriage.  We discussed that this is for each time the procedure is completed. This means that theoretically for twins, if a sample was taken that is meant to representative for each baby, there would be an overall approximate 1 in  200 risk of complications, including a 1 in 400 risk for miscarriage. Some studies report an increase in risk, where as other studies do not.      7. Additional Family History Information:  The patient has a family history of intellectual disability (paternal half-sister's daughter).  The patient did not know if any genetic testing has been performed for the individual with intellectual disability.  Intellectual disability may occur as part of a chromosome abnormality or single gene disorder.  When isolated, it may result from a combination of genes and environment, also referred to as multifactorial causes. Without further information it is difficult to predict risk of intellectual disability for the current pregnancy, but may be increased above that of the general population. A general genetics evaluation is recommended for any individual with intellectual disability and an appointment can be made by calling [phone].  If an underlying genetic etiology in the family is determined, precise recurrence risks could be provided, and testing for other family members may be available if clinically indicated.    The patient had a sibling that  from SIDS. Infants who die suddenly and unexpectedly may be a) vulnerable infants that may have an unknown underlying defect, brain or heart abnormality, or genetic condition that affects respiration or heart rate, b) in a critical developmental period (typically within the first 6 months of life) when rapid growth and changes in homeostatic mechanisms occur, and c) experience outside stressor(s) in their environment such as a stomach sleep position, overheating, secondhand tobacco smoke, or an upper respiratory tract infection that in combination with (a) and/or (b) can lead to sudden death. Also, the cause of SIDS may be unknown.     The patient has a personal history of depression.  The patient has a family history of thyroid disease.  Often, these conditions are due to a  combination of genetic and environmental factors (which often remain unknown).  The risk to have them often depends on the amount and degree of affected family members.  It also depends on if an underlying genetic cause of these conditions can be identified.   With this history, the patient and her offspring are at an increased risk for the disorders in their respective families and this information should be shared with all relevant primary care providers.        DISPOSITION:  The patient stated that she understood the above information and elected to proceed with standard cell-free DNA screening for common aneuploidies to [facility].  All carrier screening was declined.  Diagnostic testing in the form of genetic amniocentesis was declined.      We recommend a follow up ultrasound 2 weeks from the patient's last ultrasound.      Thank you for allowing us to participate in the care of your patient.  Should you or your patient have any questions, please do not hesitate to contact our office at [phone].    Licensed Genetic Counselor Vane Reed MS, [facility] spent 42 total minutes on the day of the encounter for patient care (35 minutes with patient, 7 minutes on pre/post patient care activities).    Sincerely,     Vane Reed MS  Licensed Genetic Counselor    Reviewed by:  Dr. Koko Travis MD  Maternal Fetal Medicine Specialist

## 2025-03-26 ENCOUNTER — ROUTINE PRENATAL (OUTPATIENT)
Dept: MATERNAL FETAL MEDICINE | Facility: CLINIC | Age: 20
End: 2025-03-26

## 2025-03-26 ENCOUNTER — HOSPITAL ENCOUNTER (OUTPATIENT)
Dept: RADIOLOGY | Facility: CLINIC | Age: 20
Discharge: HOME | End: 2025-03-26

## 2025-03-26 VITALS — SYSTOLIC BLOOD PRESSURE: 126 MMHG | BODY MASS INDEX: 29.44 KG/M2 | WEIGHT: 155.8 LBS | DIASTOLIC BLOOD PRESSURE: 83 MMHG

## 2025-03-26 DIAGNOSIS — O30.002 TWIN GESTATION WITH UNKNOWN NUMBER OF PLACENTAS AND AMNIOTIC SACS IN SECOND TRIMESTER (HHS-HCC): ICD-10-CM

## 2025-03-26 DIAGNOSIS — Z3A.25 25 WEEKS GESTATION OF PREGNANCY (HHS-HCC): ICD-10-CM

## 2025-03-26 DIAGNOSIS — O30.032 MONOCHORIONIC DIAMNIOTIC TWIN GESTATION IN SECOND TRIMESTER (HHS-HCC): ICD-10-CM

## 2025-03-26 DIAGNOSIS — F32.A DEPRESSION AFFECTING PREGNANCY IN SECOND TRIMESTER, ANTEPARTUM (HHS-HCC): ICD-10-CM

## 2025-03-26 DIAGNOSIS — O99.342 DEPRESSION AFFECTING PREGNANCY IN SECOND TRIMESTER, ANTEPARTUM (HHS-HCC): ICD-10-CM

## 2025-03-26 DIAGNOSIS — O30.032 MONOCHORIONIC DIAMNIOTIC TWIN GESTATION IN SECOND TRIMESTER (HHS-HCC): Primary | ICD-10-CM

## 2025-03-26 PROCEDURE — 76821 MIDDLE CEREBRAL ARTERY ECHO: CPT

## 2025-03-26 PROCEDURE — 99214 OFFICE O/P EST MOD 30 MIN: CPT | Performed by: STUDENT IN AN ORGANIZED HEALTH CARE EDUCATION/TRAINING PROGRAM

## 2025-03-26 PROCEDURE — 99214 OFFICE O/P EST MOD 30 MIN: CPT | Mod: 25,GC | Performed by: STUDENT IN AN ORGANIZED HEALTH CARE EDUCATION/TRAINING PROGRAM

## 2025-03-26 PROCEDURE — 76815 OB US LIMITED FETUS(S): CPT

## 2025-03-28 ENCOUNTER — TELEPHONE (OUTPATIENT)
Dept: PEDIATRICS | Facility: CLINIC | Age: 20
End: 2025-03-28

## 2025-03-28 NOTE — TELEPHONE ENCOUNTER
SW received referral from women's health to discuss resources. SW called pt at [phone] and left a message asking for return phone call.    Arminda Avendano MSW, LSW

## 2025-03-31 ENCOUNTER — APPOINTMENT (OUTPATIENT)
Dept: RADIOLOGY | Facility: CLINIC | Age: 20
End: 2025-03-31

## 2025-04-01 ENCOUNTER — HOSPITAL ENCOUNTER (OUTPATIENT)
Facility: HOSPITAL | Age: 20
End: 2025-04-01
Attending: OBSTETRICS & GYNECOLOGY | Admitting: OBSTETRICS & GYNECOLOGY
Payer: MEDICAID

## 2025-04-01 ENCOUNTER — HOSPITAL ENCOUNTER (OUTPATIENT)
Facility: HOSPITAL | Age: 20
Discharge: HOME | End: 2025-04-01
Attending: OBSTETRICS & GYNECOLOGY | Admitting: OBSTETRICS & GYNECOLOGY
Payer: MEDICAID

## 2025-04-01 VITALS
WEIGHT: 157.08 LBS | TEMPERATURE: 97.5 F | OXYGEN SATURATION: 97 % | DIASTOLIC BLOOD PRESSURE: 80 MMHG | SYSTOLIC BLOOD PRESSURE: 125 MMHG | HEIGHT: 61 IN | HEART RATE: 103 BPM | BODY MASS INDEX: 29.66 KG/M2

## 2025-04-01 PROCEDURE — 99213 OFFICE O/P EST LOW 20 MIN: CPT

## 2025-04-01 PROCEDURE — 99213 OFFICE O/P EST LOW 20 MIN: CPT | Performed by: OBSTETRICS & GYNECOLOGY

## 2025-04-01 RX ORDER — LIDOCAINE HYDROCHLORIDE 10 MG/ML
0.5 INJECTION, SOLUTION EPIDURAL; INFILTRATION; INTRACAUDAL; PERINEURAL ONCE AS NEEDED
Status: DISCONTINUED | OUTPATIENT
Start: 2025-04-01 | End: 2025-04-01 | Stop reason: HOSPADM

## 2025-04-01 RX ORDER — ONDANSETRON 4 MG/1
4 TABLET, FILM COATED ORAL EVERY 6 HOURS PRN
Status: DISCONTINUED | OUTPATIENT
Start: 2025-04-01 | End: 2025-04-01 | Stop reason: HOSPADM

## 2025-04-01 RX ORDER — LABETALOL HYDROCHLORIDE 5 MG/ML
20 INJECTION, SOLUTION INTRAVENOUS ONCE AS NEEDED
Status: DISCONTINUED | OUTPATIENT
Start: 2025-04-01 | End: 2025-04-01 | Stop reason: HOSPADM

## 2025-04-01 RX ORDER — ONDANSETRON HYDROCHLORIDE 2 MG/ML
4 INJECTION, SOLUTION INTRAVENOUS EVERY 6 HOURS PRN
Status: DISCONTINUED | OUTPATIENT
Start: 2025-04-01 | End: 2025-04-01 | Stop reason: HOSPADM

## 2025-04-01 RX ORDER — HYDRALAZINE HYDROCHLORIDE 20 MG/ML
5 INJECTION INTRAMUSCULAR; INTRAVENOUS ONCE AS NEEDED
Status: DISCONTINUED | OUTPATIENT
Start: 2025-04-01 | End: 2025-04-01 | Stop reason: HOSPADM

## 2025-04-01 NOTE — H&P
OB Triage H&P    Assessment/Plan    Suly Olson is a 19 y.o.  at 26w2d, GISSELLE: 2025, by Other [employer], who presents to triage with [place] contractions.       Plan    -Fetal monitoring reassuring- appropriate for gestational age   - Cervix closed.  No clear contractions on toco and pt is comfortable in appearance.   -Good fetal movement  -Up to date on prenatal care  -Continue routine prenatal care    Dispo  -Patient appropriate for discharge home, agrees with plan  -Return precautions discussed   -Follow up at next scheduled OB appointment or to triage sooner as needed    Pregnancy Problems (from 25 to present)       Problem Noted Diagnosed Resolved    25 weeks gestation of pregnancy ([facility]) 2025 by Camille Neumann MD  No    Priority:  Medium       Overview Addendum 3/26/2025  2:21 PM by Camille Neumann MD     Desired provider in labor: [] CNM  [x] Physician  [] Initial BMI: cannot be calculated   [x] Prenatal Labs: WNL, in media tab  [x] Rh status: positive  [x] Genetic Screening:  risk reducing Tab cfDNA  [x] Pregnancy dated by: first trimester US, report/images not available     [x] Anatomy US: incomplete on   [] 1hr GCT at 24-28wks:   [] Fetal Surveillance (if indicated): q2 week ultrasounds  [] Tdap (27-36wks):      [x] Breastfeeding: breast pump Rx given  [] Postpartum Birth control method: currently in same sex relationship & undecided on birth control  [] GBS at 36 - 37 wks:   [] 39 weeks discussion of IOL vs. Expectant management:   [] Mode of delivery ( anticipated ):            Monochorionic diamniotic twin gestation in second trimester ([facility]) 2025 by Camille Neumann MD  No    Priority:  Medium       Overview Signed 3/25/2025 11:16 AM by Camille Neumann MD     [x] fetal echos - LOC 0 x2  [x] genetics - rr cfDNA  [] q2 week TTTS screening starting at 16 weeks   [] delivery at 36wks              Depression affecting pregnancy in second trimester, antepartum ([facility]) 2025  by Camille Neumann MD  No    Priority:  Medium             Subjective   She says yesterday she noticed that she was having more mucous discharge.   Then today, while at work, she was having an intermittent tightening feeling in her abdomen.  It was not painful but it was persistent.  She tried walking around and also tried sitting down but it stayed.  She called and was advised to come to triage.     She has not had any abdominal pain. Denies vaginal bleeding or LOF.  She has not had any painful contractions.     Prenatal Provider MFM    OB History    Para Term  AB Living   1 0 0 0 0 0   SAB IAB Ectopic Multiple Live Births   0 0 0 0 0      # Outcome Date GA Lbr [place]/2nd Weight Sex Type Anes PTL Lv   1 Current                History reviewed. No pertinent surgical history.    Social History     Tobacco Use    Smoking status: Never    Smokeless tobacco: Never   Substance Use Topics    Alcohol use: Never       No Known Allergies    Medications Prior to Admission   Medication Sig Dispense Refill Last Dose/Taking    prenatal vitamin, iron-folic, 27 mg iron-800 mcg folic acid tablet Take 1 tablet by mouth once daily. (Patient not taking: Reported on 2025)   More than a month     Objective     Last Vitals  Temp Pulse Resp BP MAP O2 Sat   36.4 °C (97.5 °F) 103   125/80 97 97 %     Blood Pressures         2025  1525             BP: 125/80             Physical Exam  General: NAD, mood appropriate  Cardiopulmonary: warm and well perfused, breathing comfortably on room air  Abdomen: Gravid, non-tender  Extremities: Symmetric  Cervix: Closed/thick     Chaperone Present: Yes.  Chaperone Name/Title: Caroline Cortes RN   Examination Chaperoned:  Cervical exam      Fetal Monitoring  AGA for both babies - normal baselines, no decelerations noted   [place]: acontractile          Yes

## 2025-04-14 ENCOUNTER — HOSPITAL ENCOUNTER (OUTPATIENT)
Dept: RADIOLOGY | Facility: CLINIC | Age: 20
Discharge: HOME | End: 2025-04-14
Payer: MEDICAID

## 2025-04-14 DIAGNOSIS — O30.002 TWIN GESTATION WITH UNKNOWN NUMBER OF PLACENTAS AND AMNIOTIC SACS IN SECOND TRIMESTER (HHS-HCC): ICD-10-CM

## 2025-04-14 PROCEDURE — 76819 FETAL BIOPHYS PROFIL W/O NST: CPT | Performed by: OBSTETRICS & GYNECOLOGY

## 2025-04-14 PROCEDURE — 76816 OB US FOLLOW-UP PER FETUS: CPT

## 2025-04-14 PROCEDURE — 76821 MIDDLE CEREBRAL ARTERY ECHO: CPT | Performed by: OBSTETRICS & GYNECOLOGY

## 2025-04-14 PROCEDURE — 76816 OB US FOLLOW-UP PER FETUS: CPT | Performed by: OBSTETRICS & GYNECOLOGY

## 2025-04-16 DIAGNOSIS — R73.09 ELEVATED GLUCOSE TOLERANCE TEST: Primary | ICD-10-CM

## 2025-04-17 ENCOUNTER — HOSPITAL ENCOUNTER (OUTPATIENT)
Age: 20
Discharge: HOME | End: 2025-04-17
Attending: OBSTETRICS & GYNECOLOGY | Admitting: OBSTETRICS & GYNECOLOGY

## 2025-04-17 VITALS
SYSTOLIC BLOOD PRESSURE: 124 MMHG | DIASTOLIC BLOOD PRESSURE: 70 MMHG | WEIGHT: 159 LBS | HEART RATE: 116 BPM | OXYGEN SATURATION: 98 % | TEMPERATURE: 98 F | BODY MASS INDEX: 29.08 KG/M2 | RESPIRATION RATE: 16 BRPM

## 2025-04-17 DIAGNOSIS — O23.43 UTI (URINARY TRACT INFECTION) DURING PREGNANCY, THIRD TRIMESTER: Primary | ICD-10-CM

## 2025-04-17 PROBLEM — N39.0 POSTCOITAL URINARY TRACT INFECTION: Status: ACTIVE | Noted: 2025-04-17

## 2025-04-17 PROBLEM — Z3A.28 PREGNANCY WITH 28 COMPLETED WEEKS GESTATION: Status: ACTIVE | Noted: 2025-04-17

## 2025-04-17 LAB
AMPHET UR QL SCN: NORMAL
BACTERIA URNS QL MICRO: ABNORMAL /HPF
BARBITURATES UR QL SCN: NORMAL
BENZODIAZ UR QL SCN: NORMAL
BILIRUB UR QL STRIP: NEGATIVE
CANNABINOIDS UR QL SCN: NORMAL
CLARITY UR: ABNORMAL
COCAINE UR QL SCN: NORMAL
COLOR UR: YELLOW
DRUG SCREEN COMMENT UR-IMP: NORMAL
EPI CELLS #/AREA URNS AUTO: ABNORMAL /HPF (ref 0–5)
FENTANYL SCREEN, URINE: NORMAL
GLUCOSE 1H P 50 G GLC PO SERPL-MCNC: 152 MG/DL
GLUCOSE UR STRIP-MCNC: NEGATIVE MG/DL
HGB UR QL STRIP: NEGATIVE
HYALINE CASTS #/AREA URNS AUTO: ABNORMAL /HPF (ref 0–5)
KETONES UR STRIP-MCNC: NEGATIVE MG/DL
LEUKOCYTE ESTERASE UR QL STRIP: ABNORMAL
METHADONE UR QL SCN: NORMAL
NITRITE UR QL STRIP: NEGATIVE
OPIATES UR QL SCN: NORMAL
OXYCODONE UR QL SCN: NORMAL
PCP UR QL SCN: NORMAL
PH UR STRIP: 7 [PH] (ref 5–9)
PROPOXYPH UR QL SCN: NORMAL
PROT UR STRIP-MCNC: NEGATIVE MG/DL
RBC #/AREA URNS AUTO: ABNORMAL /HPF (ref 0–5)
SP GR UR STRIP: 1.02 (ref 1–1.03)
T PALLIDUM AB SER QL IA: NEGATIVE
URINE REFLEX TO CULTURE: YES
UROBILINOGEN UR STRIP-ACNC: 0.2 E.U./DL
WBC #/AREA URNS AUTO: ABNORMAL /HPF (ref 0–5)

## 2025-04-17 PROCEDURE — 99283 EMERGENCY DEPT VISIT LOW MDM: CPT

## 2025-04-17 PROCEDURE — 81001 URINALYSIS AUTO W/SCOPE: CPT

## 2025-04-17 PROCEDURE — 59025 FETAL NON-STRESS TEST: CPT | Performed by: OBSTETRICS & GYNECOLOGY

## 2025-04-17 PROCEDURE — 99214 OFFICE O/P EST MOD 30 MIN: CPT | Performed by: OBSTETRICS & GYNECOLOGY

## 2025-04-17 PROCEDURE — 80307 DRUG TEST PRSMV CHEM ANLYZR: CPT

## 2025-04-17 PROCEDURE — 87086 URINE CULTURE/COLONY COUNT: CPT

## 2025-04-17 RX ORDER — NITROFURANTOIN 25; 75 MG/1; MG/1
100 CAPSULE ORAL 2 TIMES DAILY
Qty: 20 CAPSULE | Refills: 0 | Status: SHIPPED | OUTPATIENT
Start: 2025-04-17 | End: 2025-04-27

## 2025-04-17 RX ORDER — NITROFURANTOIN 25; 75 MG/1; MG/1
100 CAPSULE ORAL EVERY 12 HOURS SCHEDULED
Status: DISCONTINUED | OUTPATIENT
Start: 2025-04-17 | End: 2025-04-17

## 2025-04-17 NOTE — PROGRESS NOTES
Dr. Rushing bedside speaking with patient and evaluating. SVE completed-patient tolerated well-no blood noted. Patient to start antibiotics and okay for discharge. Labor precautions given. Patient advised to stay on pelvic rest. Patient verbalized understanding.

## 2025-04-17 NOTE — PROGRESS NOTES
Patient came into triage stating that she was having some spotting. Patient denies leaking of fluid. Patient states she has had sexual intercourse in the last 24 hours. Patient denies headache, blurred vision, nausea, vomiting, diarrhea, and constipation. Patient states positive fetal movement.

## 2025-04-17 NOTE — PROGRESS NOTES
Discharge paperwork reviewed with patient. Patient educated on picking up antibiotics and how and when to take. Patient verbalized understanding and denied any further questions. Patient ambulated off OB unit in stable condition.

## 2025-04-17 NOTE — ED TRIAGE NOTES
Department of Obstetrics and Gynecology  Labor and Delivery  Tacos Rushing MD: OB Triage Note      SUBJECTIVE:  Patient is a 21 yo  female @ 28.4 weeks with EDC s/b LMP of 24 c/b 19.3 week sono.   This pregnancy is complicated by MONOCHORIONIC- Diamniotic Twins. She receives care at [facility] with MFM. She reports some lower abdominal cramping that started today shortly after she noticed an episode of spotting. She went to bathroom this morning and noticed pink tinged discharge on the toilet tissue. She described cramping    OBJECTIVE    ROS:  Gen: Negative   CV: Negative  Lungs: Negative  Abdomen: +FM, cramping  Pelvis: Spotting  Rest of systems reviewed and found to be negative.    Vitals:  /70 Comment: Right upper arm  Pulse (!) 116   Temp 98 °F (36.7 °C) (Oral)   Resp 16   LMP 2024   SpO2 98%     PE:   Gen: A x O x 3, in NAD  Abdomen: soft, gravid, +FM  Cervix:             Dilation:  Closed         Effacement:  0%         Station:  -3         Consistency:  Medium         Position: Posterior     Membranes: Intact     Fetal heart rate:  Category I (REACTIVE) -duration        Baseline Heart Rate: A 140s R, B 130s R        Accelerations:  present       Decelerations:  none        Variability:  moderate    Contraction frequency: irritability     DATA:  Results     Component Value Units   URINE DRUG SCREEN [[id number]]    Collected: 25 1110    Updated: 25 1130    Order Status: Completed    Specimen Type: Urine     Amphetamine Screen, Ur Neg    Barbiturate Screen, Ur Neg    Benzodiazepine Screen, Urine Neg    Cannabinoid Scrn, Ur Neg    Cocaine Metabolite Screen, Urine Neg    Opiate Screen, Urine Neg    Phencyclidine (PCP), Screen, Urine Neg    Methadone Screen, Urine Neg    Propoxyphene Scrn, Ur Neg    Oxycodone Urine Neg    FENTANYL SCREEN, URINE Neg    Drug Screen Comment: see below    Comment: This method is a screening test to detect only these drug  classes as

## 2025-04-18 LAB — BACTERIA UR CULT: NORMAL

## 2025-04-19 LAB
GLUCOSE 1H P CHAL SERPL-MCNC: 162 MG/DL
GLUCOSE 2H P CHAL SERPL-MCNC: 70 MG/DL
GLUCOSE 3H P 100 G GLC PO SERPL-MCNC: 101 MG/DL
GLUCOSE P FAST SERPL-MCNC: 83 MG/DL (ref 65–94)
SERVICE CMNT-IMP: NORMAL

## 2025-04-26 DIAGNOSIS — Z3A.25 25 WEEKS GESTATION OF PREGNANCY (HHS-HCC): ICD-10-CM

## 2025-04-28 ENCOUNTER — APPOINTMENT (OUTPATIENT)
Dept: RADIOLOGY | Facility: CLINIC | Age: 20
End: 2025-04-28
Payer: MEDICAID

## 2025-04-28 ENCOUNTER — HOSPITAL ENCOUNTER (OUTPATIENT)
Dept: RADIOLOGY | Facility: CLINIC | Age: 20
Discharge: HOME | End: 2025-04-28
Payer: MEDICAID

## 2025-04-28 DIAGNOSIS — O30.033 MONOCHORIONIC DIAMNIOTIC TWIN GESTATION IN THIRD TRIMESTER (HHS-HCC): ICD-10-CM

## 2025-04-28 DIAGNOSIS — O30.002 TWIN GESTATION WITH UNKNOWN NUMBER OF PLACENTAS AND AMNIOTIC SACS IN SECOND TRIMESTER (HHS-HCC): ICD-10-CM

## 2025-04-28 PROCEDURE — 76821 MIDDLE CEREBRAL ARTERY ECHO: CPT | Performed by: OBSTETRICS & GYNECOLOGY

## 2025-04-28 PROCEDURE — 76816 OB US FOLLOW-UP PER FETUS: CPT | Performed by: OBSTETRICS & GYNECOLOGY

## 2025-04-28 PROCEDURE — 76816 OB US FOLLOW-UP PER FETUS: CPT

## 2025-04-28 PROCEDURE — 76819 FETAL BIOPHYS PROFIL W/O NST: CPT | Performed by: OBSTETRICS & GYNECOLOGY

## 2025-04-28 PROCEDURE — 76821 MIDDLE CEREBRAL ARTERY ECHO: CPT

## 2025-05-01 ENCOUNTER — LAB (OUTPATIENT)
Dept: LAB | Facility: HOSPITAL | Age: 20
End: 2025-05-01
Payer: MEDICAID

## 2025-05-01 ENCOUNTER — ROUTINE PRENATAL (OUTPATIENT)
Dept: MATERNAL FETAL MEDICINE | Facility: CLINIC | Age: 20
End: 2025-05-01
Payer: MEDICAID

## 2025-05-01 VITALS — WEIGHT: 159.9 LBS | BODY MASS INDEX: 30.21 KG/M2 | SYSTOLIC BLOOD PRESSURE: 114 MMHG | DIASTOLIC BLOOD PRESSURE: 77 MMHG

## 2025-05-01 DIAGNOSIS — O99.342 DEPRESSION AFFECTING PREGNANCY IN SECOND TRIMESTER, ANTEPARTUM (HHS-HCC): ICD-10-CM

## 2025-05-01 DIAGNOSIS — Z3A.25 25 WEEKS GESTATION OF PREGNANCY (HHS-HCC): Primary | ICD-10-CM

## 2025-05-01 DIAGNOSIS — F32.A DEPRESSION AFFECTING PREGNANCY IN SECOND TRIMESTER, ANTEPARTUM (HHS-HCC): ICD-10-CM

## 2025-05-01 DIAGNOSIS — O30.032 MONOCHORIONIC DIAMNIOTIC TWIN GESTATION IN SECOND TRIMESTER (HHS-HCC): ICD-10-CM

## 2025-05-01 DIAGNOSIS — Z3A.25 25 WEEKS GESTATION OF PREGNANCY (HHS-HCC): ICD-10-CM

## 2025-05-01 LAB
ERYTHROCYTE [DISTWIDTH] IN BLOOD BY AUTOMATED COUNT: 13.4 % (ref 11.5–14.5)
FOLATE SERPL-MCNC: 11.1 NG/ML
HCT VFR BLD AUTO: 36.2 % (ref 36–46)
HGB BLD-MCNC: 10.8 G/DL (ref 12–16)
MCH RBC QN AUTO: 24.4 PG (ref 26–34)
MCHC RBC AUTO-ENTMCNC: 29.8 G/DL (ref 32–36)
MCV RBC AUTO: 82 FL (ref 80–100)
NRBC BLD-RTO: 0 /100 WBCS (ref 0–0)
PLATELET # BLD AUTO: 241 X10*3/UL (ref 150–450)
RBC # BLD AUTO: 4.42 X10*6/UL (ref 4–5.2)
REFLEX ADDED, ANEMIA PANEL: NORMAL
VIT B12 SERPL-MCNC: 351 PG/ML
WBC # BLD AUTO: 5.5 X10*3/UL (ref 4.4–11.3)

## 2025-05-01 PROCEDURE — 99214 OFFICE O/P EST MOD 30 MIN: CPT | Performed by: OBSTETRICS & GYNECOLOGY

## 2025-05-01 PROCEDURE — 99214 OFFICE O/P EST MOD 30 MIN: CPT | Mod: 25 | Performed by: OBSTETRICS & GYNECOLOGY

## 2025-05-01 PROCEDURE — 36415 COLL VENOUS BLD VENIPUNCTURE: CPT

## 2025-05-01 PROCEDURE — 83550 IRON BINDING TEST: CPT

## 2025-05-01 PROCEDURE — 85027 COMPLETE CBC AUTOMATED: CPT

## 2025-05-01 PROCEDURE — 82728 ASSAY OF FERRITIN: CPT

## 2025-05-01 PROCEDURE — 90715 TDAP VACCINE 7 YRS/> IM: CPT | Performed by: OBSTETRICS & GYNECOLOGY

## 2025-05-01 PROCEDURE — 82746 ASSAY OF FOLIC ACID SERUM: CPT

## 2025-05-01 PROCEDURE — 82607 VITAMIN B-12: CPT

## 2025-05-01 ASSESSMENT — ENCOUNTER SYMPTOMS
OCCASIONAL FEELINGS OF UNSTEADINESS: 0
RESPIRATORY NEGATIVE: 0
GASTROINTESTINAL NEGATIVE: 0
LOSS OF SENSATION IN FEET: 0
DEPRESSION: 0
HEMATOLOGIC/LYMPHATIC NEGATIVE: 0
NEUROLOGICAL NEGATIVE: 0
MUSCULOSKELETAL NEGATIVE: 0
CARDIOVASCULAR NEGATIVE: 0
ALLERGIC/IMMUNOLOGIC NEGATIVE: 0
EYES NEGATIVE: 0
PSYCHIATRIC NEGATIVE: 0
ENDOCRINE NEGATIVE: 0
CONSTITUTIONAL NEGATIVE: 0

## 2025-05-01 ASSESSMENT — PATIENT HEALTH QUESTIONNAIRE - PHQ9
2. FEELING DOWN, DEPRESSED OR HOPELESS: NOT AT ALL
1. LITTLE INTEREST OR PLEASURE IN DOING THINGS: NOT AT ALL
SUM OF ALL RESPONSES TO PHQ9 QUESTIONS 1 AND 2: 0

## 2025-05-01 ASSESSMENT — COLUMBIA-SUICIDE SEVERITY RATING SCALE - C-SSRS
2. HAVE YOU ACTUALLY HAD ANY THOUGHTS OF KILLING YOURSELF?: NO
1. IN THE PAST MONTH, HAVE YOU WISHED YOU WERE DEAD OR WISHED YOU COULD GO TO SLEEP AND NOT WAKE UP?: NO
6. HAVE YOU EVER DONE ANYTHING, STARTED TO DO ANYTHING, OR PREPARED TO DO ANYTHING TO END YOUR LIFE?: NO

## 2025-05-01 NOTE — PROGRESS NOTES
MFM Follow-up  2025        SUBJECTIVE    HPI: Suly Olson is a 20 y.o.  at 30w4d here for RPNV. Denies contractions, bleeding, or LOF. Reports normal fetal movement. Patient reports pelvic pressure, wants to try pregnancy belt.    Preg n/f:  - Mo/do twins  - Elevated 1 hr, normal 3 hr  - Depression: reports good mood    OBJECTIVE  Visit Vitals  /77   Wt 72.5 kg (159 lb 14.4 oz)   BMI 30.21 kg/m²   OB Status Pregnant   Smoking Status Never   BSA 1.77 m²      FHT: A 142, B 152    ASSESSMENT & PLAN    Suly Olson is a 20 y.o.  at 30w4d here for the following concerns we addressed today:    25 weeks gestation of pregnancy ([facility])  - tdap today  - CBC today to screen for anemia  - pregnancy belt ordered    Monochorionic diamniotic twin gestation in second trimester ([facility])  - cont q2wk US, next        Orders Placed This Encounter   Procedures    Tdap vaccine, age 7 years and older  ([employer]RI[employer])    CBC Anemia Panel With Reflex,Pregnancy     Standing Status:   Future     Number of Occurrences:   1     Expected Date:   2025     Expiration Date:   2026     Release result to [place]:   Immediate [1]     [facility] Carveout?:   CARVEOUT: SEND TO [facility]        RTC in 2 weeks    Patient seen and evaluated with Dr. Jacek Pantoja MD PGY3

## 2025-05-02 LAB
FERRITIN SERPL-MCNC: 8 NG/ML
IRON SATN MFR SERPL: NORMAL %
IRON SERPL-MCNC: 30 UG/DL
TIBC SERPL-MCNC: NORMAL UG/DL
UIBC SERPL-MCNC: >450 UG/DL

## 2025-05-03 ENCOUNTER — HOSPITAL ENCOUNTER (OUTPATIENT)
Facility: HOSPITAL | Age: 20
End: 2025-05-03
Attending: STUDENT IN AN ORGANIZED HEALTH CARE EDUCATION/TRAINING PROGRAM | Admitting: STUDENT IN AN ORGANIZED HEALTH CARE EDUCATION/TRAINING PROGRAM
Payer: MEDICAID

## 2025-05-03 ENCOUNTER — HOSPITAL ENCOUNTER (OUTPATIENT)
Facility: HOSPITAL | Age: 20
Discharge: HOME | End: 2025-05-03
Attending: STUDENT IN AN ORGANIZED HEALTH CARE EDUCATION/TRAINING PROGRAM | Admitting: STUDENT IN AN ORGANIZED HEALTH CARE EDUCATION/TRAINING PROGRAM
Payer: MEDICAID

## 2025-05-03 VITALS
DIASTOLIC BLOOD PRESSURE: 73 MMHG | TEMPERATURE: 97.7 F | HEART RATE: 91 BPM | WEIGHT: 160.94 LBS | HEIGHT: 61 IN | BODY MASS INDEX: 30.39 KG/M2 | SYSTOLIC BLOOD PRESSURE: 117 MMHG | RESPIRATION RATE: 16 BRPM | OXYGEN SATURATION: 98 %

## 2025-05-03 LAB
APPEARANCE UR: ABNORMAL
BACTERIA #/AREA URNS AUTO: ABNORMAL /HPF
BILIRUB UR STRIP.AUTO-MCNC: NEGATIVE MG/DL
COLOR UR: ABNORMAL
GLUCOSE UR STRIP.AUTO-MCNC: NORMAL MG/DL
KETONES UR STRIP.AUTO-MCNC: NEGATIVE MG/DL
LEUKOCYTE ESTERASE UR QL STRIP.AUTO: ABNORMAL
MUCOUS THREADS #/AREA URNS AUTO: ABNORMAL /LPF
NITRITE UR QL STRIP.AUTO: NEGATIVE
PH UR STRIP.AUTO: 7 [PH]
PROT UR STRIP.AUTO-MCNC: NEGATIVE MG/DL
RBC # UR STRIP.AUTO: NEGATIVE MG/DL
RBC #/AREA URNS AUTO: ABNORMAL /HPF
SP GR UR STRIP.AUTO: 1.01
SQUAMOUS #/AREA URNS AUTO: ABNORMAL /HPF
UROBILINOGEN UR STRIP.AUTO-MCNC: NORMAL MG/DL
WBC #/AREA URNS AUTO: ABNORMAL /HPF

## 2025-05-03 PROCEDURE — 81001 URINALYSIS AUTO W/SCOPE: CPT | Performed by: STUDENT IN AN ORGANIZED HEALTH CARE EDUCATION/TRAINING PROGRAM

## 2025-05-03 PROCEDURE — 99214 OFFICE O/P EST MOD 30 MIN: CPT

## 2025-05-03 PROCEDURE — 87086 URINE CULTURE/COLONY COUNT: CPT | Mod: STJLAB | Performed by: STUDENT IN AN ORGANIZED HEALTH CARE EDUCATION/TRAINING PROGRAM

## 2025-05-03 PROCEDURE — 99223 1ST HOSP IP/OBS HIGH 75: CPT | Performed by: STUDENT IN AN ORGANIZED HEALTH CARE EDUCATION/TRAINING PROGRAM

## 2025-05-03 SDOH — HEALTH STABILITY: MENTAL HEALTH: WISH TO BE DEAD (PAST 1 MONTH): NO

## 2025-05-03 SDOH — SOCIAL STABILITY: SOCIAL INSECURITY: VERBAL ABUSE: DENIES

## 2025-05-03 SDOH — SOCIAL STABILITY: SOCIAL INSECURITY: DO YOU FEEL ANYONE HAS EXPLOITED OR TAKEN ADVANTAGE OF YOU FINANCIALLY OR OF YOUR PERSONAL PROPERTY?: NO

## 2025-05-03 SDOH — SOCIAL STABILITY: SOCIAL INSECURITY: PHYSICAL ABUSE: DENIES

## 2025-05-03 SDOH — HEALTH STABILITY: MENTAL HEALTH: SUICIDAL BEHAVIOR (LIFETIME): NO

## 2025-05-03 SDOH — HEALTH STABILITY: MENTAL HEALTH: NON-SPECIFIC ACTIVE SUICIDAL THOUGHTS (PAST 1 MONTH): NO

## 2025-05-03 SDOH — SOCIAL STABILITY: SOCIAL INSECURITY: ARE YOU OR HAVE YOU BEEN THREATENED OR ABUSED PHYSICALLY, EMOTIONALLY, OR SEXUALLY BY ANYONE?: NO

## 2025-05-03 SDOH — SOCIAL STABILITY: SOCIAL INSECURITY: DOES ANYONE TRY TO KEEP YOU FROM HAVING/CONTACTING OTHER FRIENDS OR DOING THINGS OUTSIDE YOUR HOME?: NO

## 2025-05-03 SDOH — ECONOMIC STABILITY: HOUSING INSECURITY: DO YOU FEEL UNSAFE GOING BACK TO THE PLACE WHERE YOU ARE LIVING?: NO

## 2025-05-03 SDOH — SOCIAL STABILITY: SOCIAL INSECURITY: HAS ANYONE EVER THREATENED TO HURT YOUR FAMILY OR YOUR PETS?: NO

## 2025-05-03 SDOH — HEALTH STABILITY: MENTAL HEALTH: WERE YOU ABLE TO COMPLETE ALL THE BEHAVIORAL HEALTH SCREENINGS?: YES

## 2025-05-03 SDOH — SOCIAL STABILITY: SOCIAL INSECURITY: ARE THERE ANY APPARENT SIGNS OF INJURIES/BEHAVIORS THAT COULD BE RELATED TO ABUSE/NEGLECT?: NO

## 2025-05-03 SDOH — SOCIAL STABILITY: SOCIAL INSECURITY: HAVE YOU HAD THOUGHTS OF HARMING ANYONE ELSE?: NO

## 2025-05-03 SDOH — SOCIAL STABILITY: SOCIAL INSECURITY: HAVE YOU HAD ANY THOUGHTS OF HARMING ANYONE ELSE?: NO

## 2025-05-03 SDOH — SOCIAL STABILITY: SOCIAL INSECURITY: ABUSE SCREEN: ADULT

## 2025-05-03 ASSESSMENT — PAIN SCALES - GENERAL
PAINLEVEL_OUTOF10: 6
PAINLEVEL_OUTOF10: 6
PAINLEVEL_OUTOF10: 3

## 2025-05-03 ASSESSMENT — LIFESTYLE VARIABLES
AUDIT-C TOTAL SCORE: 0
AUDIT-C TOTAL SCORE: 0
HOW OFTEN DO YOU HAVE 6 OR MORE DRINKS ON ONE OCCASION: NEVER
HOW MANY STANDARD DRINKS CONTAINING ALCOHOL DO YOU HAVE ON A TYPICAL DAY: PATIENT DOES NOT DRINK
HOW OFTEN DO YOU HAVE A DRINK CONTAINING ALCOHOL: NEVER
SKIP TO QUESTIONS 9-10: 1

## 2025-05-03 ASSESSMENT — PATIENT HEALTH QUESTIONNAIRE - PHQ9
1. LITTLE INTEREST OR PLEASURE IN DOING THINGS: NOT AT ALL
SUM OF ALL RESPONSES TO PHQ9 QUESTIONS 1 & 2: 0
2. FEELING DOWN, DEPRESSED OR HOPELESS: NOT AT ALL

## 2025-05-04 NOTE — NURSING NOTE
@2030 pt reports less pain. Plans to obtain a belly binder. Pt taking medication for UTI prescribed by [facility] (unable to see the records). Pt believes it is macrobid.   @2034 Dr Lin at bedside discussing plan to discharge with patient. Pt in agreement with plan. Urine to be sent for culture. Pt has follow up appt scheduled 5/14. [place] plans [place] @[facility] @ around 36 weeks via c/s.  Pt taken off of monitor per Dr Lin

## 2025-05-04 NOTE — H&P
OB Triage H&P    Assessment/Plan    Suly Olson is a 20 y.o.  at 30w6d, GISSELLE: 2025, by Other Basis, who presents to triage with lower abdominal cramping with leg movement.    Plan    -Fetal monitoring reassuring for gestational age and mo-di twins  -Good fetal movement, pain somewhat improved   -no need to recheck dilation given closed cervix and overall comfort  -will send urine for culture given 500 leuks to ensure macrobid is working  -counseled patient on expectations for contractions with twins, return precautions given   -bedside US reassuring for both fetuses, feeling movement after hydration   -Continue prenatal care with MFM    Dispo  -Patient appropriate for discharge home, agrees with plan  -Return precautions discussed   -Follow up at next scheduled OB appointment or to triage sooner as needed      Pregnancy Problems (from 25 to present)       Problem Noted Diagnosed Resolved    25 weeks gestation of pregnancy ([facility]) 2025 by Camille Neumann MD  No    Priority:  Medium       Overview Addendum 2025  1:50 PM by Cee Pantoja MD   Desired provider in labor: [] CNM  [x] Physician  [] Initial BMI: cannot be calculated   [x] Prenatal Labs: WNL, in media tab  [x] Rh status: positive  [x] Genetic Screening:  risk reducing Tab cfDNA  [x] Pregnancy dated by: first trimester US, report/images not available     [x] Anatomy US: incomplete on   [x] 1hr GCT at 24-28wks: elevated 1 hr, normal 3 hr  [] Fetal Surveillance (if indicated): q2 week ultrasounds  [x] Tdap (27-36wks): given      [x] Breastfeeding: breast pump Rx given  [] Postpartum Birth control method: currently in same sex relationship & undecided on birth control  [] GBS at 36 - 37 wks:   [] 39 weeks discussion of IOL vs. Expectant management:   [] Mode of delivery ( anticipated ):            Monochorionic diamniotic twin gestation in second trimester ([facility]) 2025 by Camille Neumann MD  No    Priority:  Medium        Overview Signed 3/25/2025 11:16 AM by Camille Neumann MD   [x] fetal echos - LOC 0 x2  [x] genetics - rr cfDNA  [] q2 week TTTS screening starting at 16 weeks   [] delivery at 36wks              Depression affecting pregnancy in second trimester, antepartum ([facility]) 2025 by Camille Neumann MD  No    Priority:  Medium               Subjective   Suly reports lowe abdominal cramping with leg movement for the last couple of days. No recent intercourse. No VB or abnormal discharge. Is currently completing a course of macrobid for hematuria given at [facility]. No dysuria. Cramping is only with leg movement, not constant. Feels less fetal movement than usual.     Prenatal Provider KRISTEL Lambert    OB History    Para Term  AB Living   1 0 0 0 0 0   SAB IAB Ectopic Multiple Live Births   0 0 0 0 0      # Outcome Date GA Lbr Booker/2nd Weight Sex Type Anes PTL Lv   1 Current                Surgical History[1]    Social History     Tobacco Use    Smoking status: Never    Smokeless tobacco: Never   Substance Use Topics    Alcohol use: Never       Allergies[2]    Prescriptions Prior to Admission[3]  Objective     Last Vitals  Temp Pulse Resp BP MAP O2 Sat   36.5 °C (97.7 °F) 91 16 117/73 90 98 %     Blood Pressures         5/3/2025  1857 5/3/2025  2032          BP: 111/71 117/73               Physical Exam  General: NAD, mood appropriate  Cardiopulmonary: warm and well perfused, breathing comfortably on room air  Abdomen: Gravid, non-tender  Extremities: Symmetric  Cervix: Closed /0 /  -3    Chaperone Present: Yes.  Examination Chaperoned: Entire Physical Exam     Fetal Monitoring  Baseline: 120 baby A, 140 baby B Variability: moderate for both,  Accelerations: present 10x10 for baby A and B, and Decelerations: none  Uterine Activity: No contractions seen on toco  Interpretation: Reactive    Bedside ultrasound: Yes    Labs in chart were reviewed.   Results from last 7 days   Lab Units 25  1359   WBC AUTO  x10*3/uL 5.5   HEMOGLOBIN g/dL 10.8*   HEMATOCRIT % 36.2   PLATELETS AUTO x10*3/uL 241        Prenatal labs reviewed, remarkable for failed 1 hour, passed 3 hour gtt, Also Hgb 10.8, on iron with PNV.             [1] No past surgical history on file.  [2] No Known Allergies  [3]   Medications Prior to Admission   Medication Sig Dispense Refill Last Dose/Taking    prenatal vitamin, iron-folic, 27 mg iron-800 mcg folic acid tablet Take 1 tablet by mouth once daily.   5/3/2025

## 2025-05-06 DIAGNOSIS — R79.0 LOW FERRITIN LEVEL: Primary | ICD-10-CM

## 2025-05-06 LAB — BACTERIA UR CULT: NORMAL

## 2025-05-06 RX ORDER — FERROUS SULFATE 325(65) MG
325 TABLET ORAL DAILY
Qty: 90 TABLET | Refills: 3 | Status: SHIPPED | OUTPATIENT
Start: 2025-05-06 | End: 2026-05-06

## 2025-05-11 PROBLEM — Z3A.32 32 WEEKS GESTATION OF PREGNANCY (HHS-HCC): Status: ACTIVE | Noted: 2025-02-22

## 2025-05-11 NOTE — ASSESSMENT & PLAN NOTE
-GBS collected today   -Otherwise up to date on prenatal care   Orders:    Group B Streptococcus (GBS) Prenatal Screen, Culture

## 2025-05-11 NOTE — ASSESSMENT & PLAN NOTE
-Discussed delivery timin weeks   -Discussed delivery mode - desires primary  section   - testing per FGR

## 2025-05-11 NOTE — PROGRESS NOTES
[facility] Follow-up  2025   3:06 PM     SUBJECTIVE    HPI: Suly Olson is a 20 y.o.  at 32w3d here for RPNV. Denies contractions, bleeding, or LOF. Reports normal fetal movement. Patient reports that she was seen at [facility] for urinary complaints and treated with antibiotic. She continues to have the same cramping symptoms as when she was treated and would like to be retested today. She is excited to have the babies and desires a primary  section.     OBJECTIVE    Visit Vitals  /80 Comment: 91   Wt 76.9 kg (169 lb 9.6 oz)   BMI 32.05 kg/m²   OB Status Pregnant   Smoking Status Never   BSA 1.82 m²      General - in no acute distress, resting comfortably  CV - regular rate   Resp - non labored on room air, normal effort   Abd - gravid, soft, no obvious masses   Skin - no rashes   FHT: obtained on US today     ASSESSMENT & PLAN    Suly Olson is a 20 y.o.  at 32w3d here for the following concerns we addressed today:    Assessment & Plan  Monochorionic diamniotic twin gestation in second trimester ([facility])  -Discussed delivery timin weeks   -Discussed delivery mode - desires primary  section   - testing per FGR       32 weeks gestation of pregnancy ([facility])  -GBS collected today   -Otherwise up to date on prenatal care   Orders:    Group B Streptococcus (GBS) Prenatal Screen, Culture    Dysuria during pregnancy in third trimester ([facility])  -continued symptoms of UTI after treatment   -Retest urine today with culture   Orders:    Urinalysis with Reflex Microscopic; Future    Urine culture    Fetal growth restriction antepartum ([facility])  -Growth US today with FGR of Twin A -  EFW: 1686g (9%) with normal dopplers   -Normal growth of Twin B, though with elevated dopplers   -Growth is concordant   -Twice weekly  testing to be performed Tues/Friday   -Delivery planned at 34 weeks   -The US findings were discussed with the patient today          Delivery to be scheduled  for 34 weeks.     Patient seen and evaluated with Dr. Diana Neumann MD

## 2025-05-14 ENCOUNTER — HOSPITAL ENCOUNTER (OUTPATIENT)
Dept: RADIOLOGY | Facility: CLINIC | Age: 20
Discharge: HOME | End: 2025-05-14
Payer: MEDICAID

## 2025-05-14 ENCOUNTER — ROUTINE PRENATAL (OUTPATIENT)
Dept: MATERNAL FETAL MEDICINE | Facility: CLINIC | Age: 20
End: 2025-05-14
Payer: MEDICAID

## 2025-05-14 VITALS — SYSTOLIC BLOOD PRESSURE: 121 MMHG | WEIGHT: 169.6 LBS | BODY MASS INDEX: 32.05 KG/M2 | DIASTOLIC BLOOD PRESSURE: 80 MMHG

## 2025-05-14 DIAGNOSIS — R30.0 DYSURIA DURING PREGNANCY IN THIRD TRIMESTER (HHS-HCC): ICD-10-CM

## 2025-05-14 DIAGNOSIS — O30.032 MONOCHORIONIC DIAMNIOTIC TWIN GESTATION IN SECOND TRIMESTER (HHS-HCC): Primary | ICD-10-CM

## 2025-05-14 DIAGNOSIS — Z3A.32 32 WEEKS GESTATION OF PREGNANCY (HHS-HCC): ICD-10-CM

## 2025-05-14 DIAGNOSIS — O30.002 TWIN GESTATION WITH UNKNOWN NUMBER OF PLACENTAS AND AMNIOTIC SACS IN SECOND TRIMESTER (HHS-HCC): ICD-10-CM

## 2025-05-14 DIAGNOSIS — O36.5990 FETAL GROWTH RESTRICTION ANTEPARTUM (HHS-HCC): ICD-10-CM

## 2025-05-14 DIAGNOSIS — O26.893 DYSURIA DURING PREGNANCY IN THIRD TRIMESTER (HHS-HCC): ICD-10-CM

## 2025-05-14 PROCEDURE — 76821 MIDDLE CEREBRAL ARTERY ECHO: CPT

## 2025-05-14 PROCEDURE — 99215 OFFICE O/P EST HI 40 MIN: CPT | Performed by: STUDENT IN AN ORGANIZED HEALTH CARE EDUCATION/TRAINING PROGRAM

## 2025-05-14 PROCEDURE — 99214 OFFICE O/P EST MOD 30 MIN: CPT | Mod: 25,GC | Performed by: STUDENT IN AN ORGANIZED HEALTH CARE EDUCATION/TRAINING PROGRAM

## 2025-05-14 PROCEDURE — 76816 OB US FOLLOW-UP PER FETUS: CPT

## 2025-05-14 PROCEDURE — 99214 OFFICE O/P EST MOD 30 MIN: CPT | Performed by: STUDENT IN AN ORGANIZED HEALTH CARE EDUCATION/TRAINING PROGRAM

## 2025-05-14 NOTE — ASSESSMENT & PLAN NOTE
-Growth US today with FGR of Twin A -  EFW: 1686g (9%) with normal dopplers   -Normal growth of Twin B, though with elevated dopplers   -Growth is concordant   -Twice weekly  testing to be performed Tues/Friday   -Delivery planned at 34 weeks   -The US findings were discussed with the patient today

## 2025-05-16 ENCOUNTER — PROCEDURE VISIT (OUTPATIENT)
Dept: OBSTETRICS AND GYNECOLOGY | Facility: CLINIC | Age: 20
End: 2025-05-16
Payer: MEDICAID

## 2025-05-16 VITALS — DIASTOLIC BLOOD PRESSURE: 80 MMHG | SYSTOLIC BLOOD PRESSURE: 122 MMHG | WEIGHT: 171.5 LBS | BODY MASS INDEX: 32.4 KG/M2

## 2025-05-16 DIAGNOSIS — O36.5990 FETAL GROWTH RESTRICTION ANTEPARTUM (HHS-HCC): ICD-10-CM

## 2025-05-16 DIAGNOSIS — O30.032 MONOCHORIONIC DIAMNIOTIC TWIN GESTATION IN SECOND TRIMESTER (HHS-HCC): Primary | ICD-10-CM

## 2025-05-16 DIAGNOSIS — Z3A.32 32 WEEKS GESTATION OF PREGNANCY (HHS-HCC): ICD-10-CM

## 2025-05-16 PROCEDURE — 59025 FETAL NON-STRESS TEST: CPT | Performed by: OBSTETRICS & GYNECOLOGY

## 2025-05-17 LAB
BACTERIA UR CULT: NORMAL
GP B STREP SPEC QL CULT: NORMAL

## 2025-05-18 NOTE — PROCEDURES
Suly Olson, a  at 33w0d with an GISSELLE of 2025, by Other Basis, was seen at [facility] for a nonstress test.    Non-Stress Test   Baseline Fetal Heart Rate for Non-Stress Test: 130 BPM  Variability in Waveform for Non-Stress Test: Moderate  Accelerations in Non-Stress Test: Yes, greater than/equal to 15 bpm, lasting at least 15 seconds  Decelerations in Non-Stress Test: None  Contractions in Non-Stress Test: Irregular  NST Contraction Frequency: q 6-10  Acoustic Stimulator for Non-Stress Test: No  Interpretation of Non-Stress Test   Interpretation of Non-Stress Test: Reactive  NST for Multiple Fetuses: Yes  Non-Stress Test B  Baseline Fetal Heart Rate for Non-Stress Test B: 135 BPM  Variability in Waveform for Non-Stress Test B: Moderate  Accelerations in Non-Stress Test B: greater than/equal to 15 bpm, lasting at least 15 seconds  Decelerations in Non-Stress Test B: None  Contractions in Non-Stress Test B: Irregular  NST B Contraction Frequency: q 6-10  Acoustic Stimulator for Non-Stress Test B: No  Interpretation of Non-Stress Test B  Interpretation of Non-Stress Test B: Reactive

## 2025-05-19 ENCOUNTER — TELEPHONE (OUTPATIENT)
Dept: OBSTETRICS AND GYNECOLOGY | Facility: CLINIC | Age: 20
End: 2025-05-19
Payer: MEDICAID

## 2025-05-19 DIAGNOSIS — O30.032 MONOCHORIONIC DIAMNIOTIC TWIN GESTATION IN SECOND TRIMESTER (HHS-HCC): ICD-10-CM

## 2025-05-19 DIAGNOSIS — O36.5990 FETAL GROWTH RESTRICTION ANTEPARTUM (HHS-HCC): ICD-10-CM

## 2025-05-19 NOTE — LETTER
SULY POTTER  : 2005  MRN: [id number]    Dear Suly Irwins,      You have been scheduled for a  Birth on Isidoro, May 25, 2025.     On that date, please arrive to the 2nd floor of the [facility] (Labor and Delivery) located at [facility]--- [address] [place] [address]--- at 7:00 am.    In preparation for your delivery, please bring items needed for your hospital stay.  In your suitcase please pack your toiletries, clothes to wear for your return home (which may be clothes you wore during pregnancy) and clothing to bring your  home in.  Nightgowns will be supplied.  You may leave your suitcase in your car and have a family member bring it to your room after delivery and transfer to the postpartum room.  At this time you may have 2 visitors -18 years or older- with you on Labor and Delivery.  For surgery, only one visitor will be allowed to go with you.    We want your  birth to go as smoothly as possible. The following information will help you know what to expect before, during and after surgery. Our team will work to provide you with excellent care.    Enhanced Recovery After Surgery  Enhanced recovery after surgery is a special approach to care that optimizes your health before, during and after surgery. This method is used for all types of surgeries and is proven to help patients recover faster from surgery. If you have had a  birth before, you may notice some changes in your plan of care.  Our prenatal care providers and nurses want to promote a healthy pregnancy for you. Follow their advice and be sure to ask questions at any time.     Before Surgery:  Below is a checklist and information to prepare you for your  birth. Every patient has different needs, so always follow the specific advice from your providers and nurses.     One to Three Days Before Surgery   Complete pre-surgery lab work at a  lab within three days  before your surgery.   For a Monday surgery, Friday at any  lab is OK   Obtain antimicrobial soap (Dial soap, for example).   Do not shave or use hair removal products near your surgical site for at least three days before delivery in order to minimize infection risk.       24 Hours Before Surgery   Continue taking all medications as instructed by your provider.   Bathe or shower with the antimicrobial soap the night before or day of your surgery.   Remove make-up, nail polish or any jewelry (including rings and body piercings).   Please leave all valuables at home, but bring the following with you to the hospital:   Photo ID and emergency contact information   Insurance/health plan card   A list of medicines you take, including over-the-counter medicines, vitamins and herbal supplements; include the medicine name, dose, how often you take it and the reason you take it   A list of any questions you have for your care team     Eating & Drinking   Do not eat anything eight hours before your surgery start time (no gum, hard candy, etc.).   You can drink clear liquids up to two hours before your surgery.   Examples: Water, plain tea or coffee (no milk, cream or sugar), Gatorade, Powerade     At the Hospital:   You will meet your care team (nursing, OB and anesthesia providers) and sign consent.   Your nurse will place an IV in your arm, draw blood and give you fluids through the IV.   You will be given medicines in your IV to help reduce nausea during and after surgery and decrease risk of infection.   Your nurse may remove hair near the surgical incision with an electric clipper.   Your support person will wait in your room while we prepare for surgery in the OR.     In the Operating Room   Most women have a spinal or combined spinal/epidural for pain control. This medicine is given through a thin tube in your back to provide pain relief during surgery.   A catheter will be placed in your bladder to drain urine.    Your vagina will be cleansed with soap to help prevent infection.   Your care team will do a safety “huddle” with you.   Your abdomen (belly) will be cleansed with soap and then covered with surgical drapes.   You will have compression boots or foot pumps on your feet during and after surgery; these are important to help prevent blood clots that can form in your legs.   Your support person will join you in the OR before the surgery begins.   During surgery, you will feel pressure and pushing, but not sharp pain. You will feel the doctors push at the top of your belly to help deliver your baby through the incision on your uterus.   After birth, a nurse will care for the baby and help you and your support person with bonding.   Your anesthesia team will give you a long-acting medicine called Duramorph through the spinal/epidural to give you pain control for up to 24 hours after surgery.   Once surgery is finished, you will also be given acetaminophen (Tylenol) and ketorolac (Toradol) to help you stay comfortable and decrease the need for extra pain medication.  Recovery Care (First Few Hours After Surgery)   Your nurse will check on you frequently, monitoring vital signs, bleeding and surgical incision.   You and your baby can bond at this time, and we will help you with skin-to-skin contact and breastfeeding, if you choose.   Helping your bowels return to normal as fast as possible can reduce your pain and help speed your recovery. You will start with ice chips and then begin drinking and eating slowly when you are ready. You may be given chewing gum to speed up your bowel recovery.   IV fluids will be stopped when you are eating/drinking if you do not need other IV medications.   You will transition to postpartum care when your frequent checks are complete, your anesthesia wears off and you can move easily in bed.     Postpartum Care (After the Recovery Care to Hospital Discharge)  We will continue to focus on a  healthy recovery to prevent complications and prepare you to go home.  Comfort Measures  All women will experience some discomfort following  birth. This is normal. Your nurses will talk with you to understand your level of discomfort and how to make you more comfortable. Below are some ways we will help you:   Providing you with such items as pillows for positioning and cool and warm packs   Providing your scheduled acetaminophen (Tylenol) and an anti-inflammatory medicine such as ketorolac  (Toradol) or ibuprofen.   These non-opioid medicines work best when taken together every six hours around the clock, including the middle of the night, even if you don't have discomfort at the time.  It is important to avoid the use of opioid medicines if possible, as they cause drowsiness or constipation. Most patients can be comfortable without opioid medicine. Some patients with severe discomfort may need an opioid medicine.    Eating, Drinking & Voiding   Your bowel function may be slower after surgery and take some time to return to normal.   Drinking liquids, eating healthy meals, taking a stool softener, minimizing opioid medicine, and walking will help your bowel function and decrease gas pain.   Chewing gum for the first few days after surgery can also be helpful - we recommend chewing gum for 20-30 minutes three times a day between meals.   Your urinary catheter will be removed six hours after surgery. Removing the catheter and walking to the bathroom helps to prevent urinary tract infections and is more comfortable for you. Your nurse will help you walk to the bathroom to void for the first time.   Occasionally, the bladder needs more rest after surgery, and a catheter may need to be replaced. Your nurse or provider will talk with you about this if it is needed.    Activity  Regular activity is a very important part of your recovery care plan. Activity can help speed your recovery, improve your bowel  function, decrease pain and prevent complications such as blood clots and pneumonia… and it helps you feel better!   Always ask for assistance getting out of bed until your care team feels you can safely walk by yourself.   We will encourage you to get out of bed as much as possible, including the day of surgery.    Activity Goals   Day/evening of your surgery: Stay out of bed for more than two hours.   Day after surgery until discharge:   Stay out of bed more than eight hours.   Take four or more walks in the halls and in your room.   Sit in a chair throughout the day and for all meals.   When in bed resting or sleeping, always wear compression boots to prevent blood clots from forming in your legs.   An injection of a blood-thinning medication (Lovenox) is often given after surgery to prevent blood clots. Your care team will tell you if this medication is needed.    Incision Care   You may have small pieces of tape called steri-strips across your incision to support the wound as it heals. They are not to hold the incision together, and will fall off on their own. Gently remove the pieces of tape if they have not fallen off within seven days after surgery.   You may have staples in your incision. Your provider may remove them while you are in the hospital or have you come into their office.     Preparing to Go Home From the Hospital:  We will make sure you are prepared to continue recovery at home. Before you leave the hospital:   Review concerning symptoms and reasons to call your provider with your care team.   Have a discussion and plan in place for birth control.   Review any prescriptions and medications you will be taking at home.   Note: if you are going home with a prescription for an opioid, it is important that you know how to get rid of unused pills safely.   Safe disposal sites can be found at: rxdrugdropbox.org   Schedule a follow-up appointment with your provider or have a plan in place for you to  schedule this appointment.   If you had high blood pressure before or during your pregnancy, you will be seen within two to five days after you leave the hospital to check your blood pressure. Bring the blood pressures paper with the blood pressure readings you have taken at home.                        Thank you,     Renita RN  [phone]      Although a rare occurrence, on occasion, Labor and Delivery experiences an unforeseen patient volume that may require us to delay your procedure/surgery. We understand the inconvenience of such an event and apologize ahead of time if this occurs. If this happens, an L&D Nurse will call to inform you of the delay, give you a new arrival time or ask you to call [phone] at a specific time to determine an arrival time. Thank you for your understanding and patience.

## 2025-05-19 NOTE — TELEPHONE ENCOUNTER
CS per Dr. Neumann  Scheduled for 25  Patient notified, identified by name and .  Aware to arrive at 0700, lab work to be done 2 days prior to surgery and nothing to eat or drink after midnight the night before surgery.  Visitor Policy reviewed.  Patient verbalized understanding.

## 2025-05-20 ENCOUNTER — HOSPITAL ENCOUNTER (OUTPATIENT)
Dept: RADIOLOGY | Facility: CLINIC | Age: 20
Discharge: HOME | End: 2025-05-20
Payer: MEDICAID

## 2025-05-20 DIAGNOSIS — O30.002 TWIN GESTATION WITH UNKNOWN NUMBER OF PLACENTAS AND AMNIOTIC SACS IN SECOND TRIMESTER (HHS-HCC): ICD-10-CM

## 2025-05-20 PROCEDURE — 76819 FETAL BIOPHYS PROFIL W/O NST: CPT

## 2025-05-20 PROCEDURE — 76816 OB US FOLLOW-UP PER FETUS: CPT

## 2025-05-22 ENCOUNTER — ANESTHESIA EVENT (OUTPATIENT)
Dept: OBSTETRICS AND GYNECOLOGY | Facility: HOSPITAL | Age: 20
End: 2025-05-22
Payer: MEDICAID

## 2025-05-23 ENCOUNTER — PROCEDURE VISIT (OUTPATIENT)
Dept: OBSTETRICS AND GYNECOLOGY | Facility: CLINIC | Age: 20
End: 2025-05-23
Payer: MEDICAID

## 2025-05-23 ENCOUNTER — LAB REQUISITION (OUTPATIENT)
Dept: LAB | Facility: HOSPITAL | Age: 20
End: 2025-05-23

## 2025-05-23 ENCOUNTER — LAB (OUTPATIENT)
Dept: LAB | Facility: HOSPITAL | Age: 20
End: 2025-05-23
Payer: MEDICAID

## 2025-05-23 ENCOUNTER — HOSPITAL ENCOUNTER (INPATIENT)
Facility: HOSPITAL | Age: 20
Discharge: STILL ADMITTED OR ADMINISTRATIVE | End: 2025-05-23
Attending: OBSTETRICS & GYNECOLOGY | Admitting: STUDENT IN AN ORGANIZED HEALTH CARE EDUCATION/TRAINING PROGRAM
Payer: MEDICAID

## 2025-05-23 VITALS
WEIGHT: 175.4 LBS | BODY MASS INDEX: 33.14 KG/M2 | DIASTOLIC BLOOD PRESSURE: 81 MMHG | HEART RATE: 96 BPM | SYSTOLIC BLOOD PRESSURE: 122 MMHG

## 2025-05-23 DIAGNOSIS — O99.342 DEPRESSION AFFECTING PREGNANCY IN SECOND TRIMESTER, ANTEPARTUM (HHS-HCC): ICD-10-CM

## 2025-05-23 DIAGNOSIS — O13.9 GESTATIONAL HYPERTENSION, ANTEPARTUM (HHS-HCC): ICD-10-CM

## 2025-05-23 DIAGNOSIS — F32.A DEPRESSION AFFECTING PREGNANCY IN SECOND TRIMESTER, ANTEPARTUM (HHS-HCC): ICD-10-CM

## 2025-05-23 DIAGNOSIS — O30.032: ICD-10-CM

## 2025-05-23 DIAGNOSIS — O36.5990 MATERNAL CARE FOR OTHER KNOWN OR SUSPECTED POOR FETAL GROWTH, UNSPECIFIED TRIMESTER, NOT APPLICABLE OR UNSPECIFIED: ICD-10-CM

## 2025-05-23 DIAGNOSIS — Z3A.33 33 WEEKS GESTATION OF PREGNANCY (HHS-HCC): ICD-10-CM

## 2025-05-23 DIAGNOSIS — O36.5990 FETAL GROWTH RESTRICTION ANTEPARTUM (HHS-HCC): ICD-10-CM

## 2025-05-23 DIAGNOSIS — O30.032 MONOCHORIONIC DIAMNIOTIC TWIN GESTATION IN SECOND TRIMESTER (HHS-HCC): ICD-10-CM

## 2025-05-23 LAB
ERYTHROCYTE [DISTWIDTH] IN BLOOD BY AUTOMATED COUNT: 14.4 % (ref 11.5–14.5)
HCT VFR BLD AUTO: 31.4 % (ref 36–46)
HGB BLD-MCNC: 9.8 G/DL (ref 12–16)
MCH RBC QN AUTO: 24.4 PG (ref 26–34)
MCHC RBC AUTO-ENTMCNC: 31.2 G/DL (ref 32–36)
MCV RBC AUTO: 78 FL (ref 80–100)
NRBC BLD-RTO: 0.7 /100 WBCS (ref 0–0)
PLATELET # BLD AUTO: 189 X10*3/UL (ref 150–450)
RBC # BLD AUTO: 4.01 X10*6/UL (ref 4–5.2)
WBC # BLD AUTO: 4.4 X10*3/UL (ref 4.4–11.3)

## 2025-05-23 PROCEDURE — 59025 FETAL NON-STRESS TEST: CPT | Performed by: OBSTETRICS & GYNECOLOGY

## 2025-05-23 PROCEDURE — 85027 COMPLETE CBC AUTOMATED: CPT

## 2025-05-23 RX ORDER — ACETAMINOPHEN 500 MG
TABLET ORAL EVERY 6 HOURS PRN
COMMUNITY
End: 2025-05-29 | Stop reason: HOSPADM

## 2025-05-23 SDOH — HEALTH STABILITY: MENTAL HEALTH: WISH TO BE DEAD (PAST 1 MONTH): NO

## 2025-05-23 SDOH — SOCIAL STABILITY: SOCIAL INSECURITY: VERBAL ABUSE: DENIES

## 2025-05-23 SDOH — HEALTH STABILITY: MENTAL HEALTH: SUICIDAL BEHAVIOR (LIFETIME): NO

## 2025-05-23 SDOH — SOCIAL STABILITY: SOCIAL INSECURITY: ABUSE SCREEN: ADULT

## 2025-05-23 SDOH — HEALTH STABILITY: MENTAL HEALTH: NON-SPECIFIC ACTIVE SUICIDAL THOUGHTS (PAST 1 MONTH): NO

## 2025-05-23 SDOH — SOCIAL STABILITY: SOCIAL INSECURITY: ARE THERE ANY APPARENT SIGNS OF INJURIES/BEHAVIORS THAT COULD BE RELATED TO ABUSE/NEGLECT?: NO

## 2025-05-23 SDOH — SOCIAL STABILITY: SOCIAL INSECURITY: PHYSICAL ABUSE: DENIES

## 2025-05-23 SDOH — ECONOMIC STABILITY: HOUSING INSECURITY: DO YOU FEEL UNSAFE GOING BACK TO THE PLACE WHERE YOU ARE LIVING?: NO

## 2025-05-23 SDOH — HEALTH STABILITY: MENTAL HEALTH: WERE YOU ABLE TO COMPLETE ALL THE BEHAVIORAL HEALTH SCREENINGS?: YES

## 2025-05-23 SDOH — SOCIAL STABILITY: SOCIAL INSECURITY: DOES ANYONE TRY TO KEEP YOU FROM HAVING/CONTACTING OTHER FRIENDS OR DOING THINGS OUTSIDE YOUR HOME?: NO

## 2025-05-23 SDOH — SOCIAL STABILITY: SOCIAL INSECURITY: ARE YOU OR HAVE YOU BEEN THREATENED OR ABUSED PHYSICALLY, EMOTIONALLY, OR SEXUALLY BY ANYONE?: NO

## 2025-05-23 SDOH — SOCIAL STABILITY: SOCIAL INSECURITY: HAVE YOU HAD ANY THOUGHTS OF HARMING ANYONE ELSE?: NO

## 2025-05-23 SDOH — SOCIAL STABILITY: SOCIAL INSECURITY: DO YOU FEEL ANYONE HAS EXPLOITED OR TAKEN ADVANTAGE OF YOU FINANCIALLY OR OF YOUR PERSONAL PROPERTY?: NO

## 2025-05-23 NOTE — PROGRESS NOTES
NST Note    NST A: Baseline 130s, moderate variability, no decelerations, accelerations present  NST B: Baseline 145s, moderate variability, no decelerations, accelerations present  TOCO: contractions q5-6 minutes - patient reports feeling them and has had then for >1w    Interpretation: Reactive    Bebo Ortiz MD  [facility]

## 2025-05-24 LAB
ABO GROUP (TYPE) IN BLOOD: NORMAL
ABO GROUP (TYPE) IN BLOOD: NORMAL
ANTIBODY SCREEN: NORMAL
BLOOD EXPIRATION DATE: NORMAL
DISPENSE STATUS: NORMAL
ERYTHROCYTE [DISTWIDTH] IN BLOOD BY AUTOMATED COUNT: 14.2 % (ref 11.5–14.5)
HBV CORE AB SER QL: NONREACTIVE
HBV SURFACE AB SER-ACNC: <3.1 MIU/ML
HBV SURFACE AG SERPL QL IA: NONREACTIVE
HCT VFR BLD AUTO: 35.3 % (ref 36–46)
HGB BLD-MCNC: 10.7 G/DL (ref 12–16)
MCH RBC QN AUTO: 23.9 PG (ref 26–34)
MCHC RBC AUTO-ENTMCNC: 30.3 G/DL (ref 32–36)
MCV RBC AUTO: 79 FL (ref 80–100)
NRBC BLD-RTO: 0.7 /100 WBCS (ref 0–0)
PLATELET # BLD AUTO: 209 X10*3/UL (ref 150–450)
POC APPEARANCE, URINE: CLEAR
POC BILIRUBIN, URINE: NEGATIVE
POC BLOOD, URINE: NEGATIVE
POC COLOR, URINE: YELLOW
POC GLUCOSE, URINE: NEGATIVE MG/DL
POC KETONES, URINE: NEGATIVE MG/DL
POC LEUKOCYTES, URINE: ABNORMAL
POC NITRITE,URINE: NEGATIVE
POC PH, URINE: 7.5 PH
POC PROTEIN, URINE: NEGATIVE MG/DL
POC SPECIFIC GRAVITY, URINE: 1.02
POC UROBILINOGEN, URINE: 1 EU/DL
PRODUCT BLOOD TYPE: 6200
PRODUCT CODE: NORMAL
RBC # BLD AUTO: 4.48 X10*6/UL (ref 4–5.2)
RH FACTOR (ANTIGEN D): NORMAL
RH FACTOR (ANTIGEN D): NORMAL
TREPONEMA PALLIDUM IGG+IGM AB [PRESENCE] IN SERUM OR PLASMA BY IMMUNOASSAY: NONREACTIVE
UNIT ABO: NORMAL
UNIT NUMBER: NORMAL
UNIT RH: NORMAL
UNIT VOLUME: 350
WBC # BLD AUTO: 5.9 X10*3/UL (ref 4.4–11.3)
XM INTEP: NORMAL

## 2025-05-24 PROCEDURE — 59025 FETAL NON-STRESS TEST: CPT | Mod: 51,GC

## 2025-05-24 PROCEDURE — 59025 FETAL NON-STRESS TEST: CPT

## 2025-05-24 PROCEDURE — 96372 THER/PROPH/DIAG INJ SC/IM: CPT

## 2025-05-24 PROCEDURE — 87340 HEPATITIS B SURFACE AG IA: CPT

## 2025-05-24 PROCEDURE — 86704 HEP B CORE ANTIBODY TOTAL: CPT

## 2025-05-24 PROCEDURE — 36415 COLL VENOUS BLD VENIPUNCTURE: CPT

## 2025-05-24 PROCEDURE — 86780 TREPONEMA PALLIDUM: CPT

## 2025-05-24 PROCEDURE — 99254 IP/OBS CNSLTJ NEW/EST MOD 60: CPT

## 2025-05-24 PROCEDURE — 86923 COMPATIBILITY TEST ELECTRIC: CPT

## 2025-05-24 PROCEDURE — 99214 OFFICE O/P EST MOD 30 MIN: CPT

## 2025-05-24 PROCEDURE — 1210000001 HC SEMI-PRIVATE ROOM DAILY

## 2025-05-24 PROCEDURE — 99232 SBSQ HOSP IP/OBS MODERATE 35: CPT

## 2025-05-24 PROCEDURE — 86901 BLOOD TYPING SEROLOGIC RH(D): CPT | Performed by: OBSTETRICS & GYNECOLOGY

## 2025-05-24 PROCEDURE — 86706 HEP B SURFACE ANTIBODY: CPT

## 2025-05-24 PROCEDURE — 2500000004 HC RX 250 GENERAL PHARMACY W/ HCPCS (ALT 636 FOR OP/ED): Mod: SE

## 2025-05-24 PROCEDURE — 2500000001 HC RX 250 WO HCPCS SELF ADMINISTERED DRUGS (ALT 637 FOR MEDICARE OP): Mod: SE

## 2025-05-24 PROCEDURE — 2500000004 HC RX 250 GENERAL PHARMACY W/ HCPCS (ALT 636 FOR OP/ED): Mod: JZ,SE

## 2025-05-24 PROCEDURE — 85027 COMPLETE CBC AUTOMATED: CPT

## 2025-05-24 PROCEDURE — 2500000004 HC RX 250 GENERAL PHARMACY W/ HCPCS (ALT 636 FOR OP/ED): Mod: SE,JZ

## 2025-05-24 RX ORDER — OXYTOCIN/0.9 % SODIUM CHLORIDE 30/500 ML
60 PLASTIC BAG, INJECTION (ML) INTRAVENOUS ONCE AS NEEDED
Status: DISCONTINUED | OUTPATIENT
Start: 2025-05-24 | End: 2025-05-24 | Stop reason: HOSPADM

## 2025-05-24 RX ORDER — CYCLOBENZAPRINE HCL 10 MG
10 TABLET ORAL ONCE
Status: DISCONTINUED | OUTPATIENT
Start: 2025-05-24 | End: 2025-05-24

## 2025-05-24 RX ORDER — LABETALOL HYDROCHLORIDE 5 MG/ML
20 INJECTION, SOLUTION INTRAVENOUS ONCE AS NEEDED
Status: DISCONTINUED | OUTPATIENT
Start: 2025-05-24 | End: 2025-05-25

## 2025-05-24 RX ORDER — ONDANSETRON 4 MG/1
4 TABLET, FILM COATED ORAL EVERY 6 HOURS PRN
Status: DISCONTINUED | OUTPATIENT
Start: 2025-05-24 | End: 2025-05-25

## 2025-05-24 RX ORDER — SIMETHICONE 80 MG
80 TABLET,CHEWABLE ORAL 4 TIMES DAILY PRN
Status: DISCONTINUED | OUTPATIENT
Start: 2025-05-24 | End: 2025-05-25

## 2025-05-24 RX ORDER — METHYLERGONOVINE MALEATE 0.2 MG/ML
0.2 INJECTION INTRAVENOUS ONCE AS NEEDED
Status: DISCONTINUED | OUTPATIENT
Start: 2025-05-24 | End: 2025-05-24 | Stop reason: HOSPADM

## 2025-05-24 RX ORDER — LIDOCAINE HYDROCHLORIDE 10 MG/ML
20 INJECTION, SOLUTION INFILTRATION; PERINEURAL ONCE AS NEEDED
Status: DISCONTINUED | OUTPATIENT
Start: 2025-05-24 | End: 2025-05-24 | Stop reason: HOSPADM

## 2025-05-24 RX ORDER — HYDRALAZINE HYDROCHLORIDE 20 MG/ML
5 INJECTION INTRAMUSCULAR; INTRAVENOUS ONCE AS NEEDED
Status: DISCONTINUED | OUTPATIENT
Start: 2025-05-24 | End: 2025-05-25

## 2025-05-24 RX ORDER — MISOPROSTOL 200 UG/1
800 TABLET ORAL ONCE AS NEEDED
Status: DISCONTINUED | OUTPATIENT
Start: 2025-05-24 | End: 2025-05-24 | Stop reason: HOSPADM

## 2025-05-24 RX ORDER — ONDANSETRON HYDROCHLORIDE 2 MG/ML
4 INJECTION, SOLUTION INTRAVENOUS EVERY 6 HOURS PRN
Status: DISCONTINUED | OUTPATIENT
Start: 2025-05-24 | End: 2025-05-25

## 2025-05-24 RX ORDER — OXYTOCIN 10 [USP'U]/ML
10 INJECTION, SOLUTION INTRAMUSCULAR; INTRAVENOUS ONCE AS NEEDED
Status: DISCONTINUED | OUTPATIENT
Start: 2025-05-24 | End: 2025-05-24 | Stop reason: HOSPADM

## 2025-05-24 RX ORDER — FENTANYL/ROPIVACAINE/NS/PF 2MCG/ML-.2
0-25 PLASTIC BAG, INJECTION (ML) INJECTION CONTINUOUS
Refills: 0 | Status: CANCELLED | OUTPATIENT
Start: 2025-05-24

## 2025-05-24 RX ORDER — LIDOCAINE HYDROCHLORIDE 10 MG/ML
0.5 INJECTION, SOLUTION INFILTRATION; PERINEURAL ONCE AS NEEDED
Status: DISCONTINUED | OUTPATIENT
Start: 2025-05-24 | End: 2025-05-25

## 2025-05-24 RX ORDER — CALCIUM CARBONATE 200(500)MG
1 TABLET,CHEWABLE ORAL EVERY 6 HOURS PRN
Status: DISCONTINUED | OUTPATIENT
Start: 2025-05-24 | End: 2025-05-25

## 2025-05-24 RX ORDER — ACETAMINOPHEN 325 MG/1
975 TABLET ORAL ONCE
Status: DISCONTINUED | OUTPATIENT
Start: 2025-05-24 | End: 2025-05-24

## 2025-05-24 RX ORDER — NALBUPHINE HYDROCHLORIDE 10 MG/ML
10 INJECTION INTRAMUSCULAR; INTRAVENOUS; SUBCUTANEOUS ONCE
Status: COMPLETED | OUTPATIENT
Start: 2025-05-24 | End: 2025-05-24

## 2025-05-24 RX ORDER — TERBUTALINE SULFATE 1 MG/ML
0.25 INJECTION SUBCUTANEOUS ONCE AS NEEDED
Status: DISCONTINUED | OUTPATIENT
Start: 2025-05-24 | End: 2025-05-24 | Stop reason: HOSPADM

## 2025-05-24 RX ORDER — NALBUPHINE HYDROCHLORIDE 10 MG/ML
10 INJECTION INTRAMUSCULAR; INTRAVENOUS; SUBCUTANEOUS ONCE
Status: DISCONTINUED | OUTPATIENT
Start: 2025-05-24 | End: 2025-05-24

## 2025-05-24 RX ORDER — ADHESIVE BANDAGE
10 BANDAGE TOPICAL
Status: DISCONTINUED | OUTPATIENT
Start: 2025-05-24 | End: 2025-05-25

## 2025-05-24 RX ORDER — LOPERAMIDE HYDROCHLORIDE 2 MG/1
4 CAPSULE ORAL EVERY 2 HOUR PRN
Status: DISCONTINUED | OUTPATIENT
Start: 2025-05-24 | End: 2025-05-24 | Stop reason: HOSPADM

## 2025-05-24 RX ORDER — SODIUM CHLORIDE, SODIUM LACTATE, POTASSIUM CHLORIDE, CALCIUM CHLORIDE 600; 310; 30; 20 MG/100ML; MG/100ML; MG/100ML; MG/100ML
75 INJECTION, SOLUTION INTRAVENOUS CONTINUOUS
Status: DISCONTINUED | OUTPATIENT
Start: 2025-05-24 | End: 2025-05-24

## 2025-05-24 RX ORDER — TRANEXAMIC ACID 1 G/10ML
1000 INJECTION, SOLUTION INTRAVENOUS ONCE AS NEEDED
Status: DISCONTINUED | OUTPATIENT
Start: 2025-05-24 | End: 2025-05-24 | Stop reason: HOSPADM

## 2025-05-24 RX ORDER — CARBOPROST TROMETHAMINE 250 UG/ML
250 INJECTION, SOLUTION INTRAMUSCULAR ONCE AS NEEDED
Status: DISCONTINUED | OUTPATIENT
Start: 2025-05-24 | End: 2025-05-24 | Stop reason: HOSPADM

## 2025-05-24 RX ORDER — CALCIUM CARBONATE 200(500)MG
1 TABLET,CHEWABLE ORAL EVERY 6 HOURS PRN
Status: DISCONTINUED | OUTPATIENT
Start: 2025-05-24 | End: 2025-05-24

## 2025-05-24 RX ORDER — BETAMETHASONE SODIUM PHOSPHATE AND BETAMETHASONE ACETATE 3; 3 MG/ML; MG/ML
12 INJECTION, SUSPENSION INTRA-ARTICULAR; INTRALESIONAL; INTRAMUSCULAR; SOFT TISSUE EVERY 24 HOURS
Status: COMPLETED | OUTPATIENT
Start: 2025-05-24 | End: 2025-05-25

## 2025-05-24 RX ORDER — MORPHINE SULFATE 0.5 MG/ML
INJECTION, SOLUTION EPIDURAL; INTRATHECAL; INTRAVENOUS CONTINUOUS PRN
Status: DISCONTINUED | OUTPATIENT
Start: 2025-05-24 | End: 2025-05-25

## 2025-05-24 RX ORDER — ONDANSETRON 4 MG/1
4 TABLET, FILM COATED ORAL EVERY 6 HOURS PRN
Status: DISCONTINUED | OUTPATIENT
Start: 2025-05-24 | End: 2025-05-24

## 2025-05-24 RX ORDER — SODIUM CHLORIDE, SODIUM LACTATE, POTASSIUM CHLORIDE, CALCIUM CHLORIDE 600; 310; 30; 20 MG/100ML; MG/100ML; MG/100ML; MG/100ML
75 INJECTION, SOLUTION INTRAVENOUS CONTINUOUS
Status: DISCONTINUED | OUTPATIENT
Start: 2025-05-24 | End: 2025-05-25

## 2025-05-24 RX ORDER — FENTANYL CITRATE 50 UG/ML
INJECTION, SOLUTION INTRAMUSCULAR; INTRAVENOUS CONTINUOUS PRN
Status: DISCONTINUED | OUTPATIENT
Start: 2025-05-24 | End: 2025-05-25

## 2025-05-24 RX ORDER — HYDRALAZINE HYDROCHLORIDE 20 MG/ML
5 INJECTION INTRAMUSCULAR; INTRAVENOUS ONCE AS NEEDED
Status: DISCONTINUED | OUTPATIENT
Start: 2025-05-24 | End: 2025-05-24 | Stop reason: HOSPADM

## 2025-05-24 RX ORDER — LABETALOL HYDROCHLORIDE 5 MG/ML
20 INJECTION, SOLUTION INTRAVENOUS ONCE AS NEEDED
Status: DISCONTINUED | OUTPATIENT
Start: 2025-05-24 | End: 2025-05-24 | Stop reason: HOSPADM

## 2025-05-24 RX ORDER — ONDANSETRON HYDROCHLORIDE 2 MG/ML
4 INJECTION, SOLUTION INTRAVENOUS EVERY 6 HOURS PRN
Status: DISCONTINUED | OUTPATIENT
Start: 2025-05-24 | End: 2025-05-24

## 2025-05-24 RX ORDER — POLYETHYLENE GLYCOL 3350 17 G/17G
17 POWDER, FOR SOLUTION ORAL 2 TIMES DAILY PRN
Status: DISCONTINUED | OUTPATIENT
Start: 2025-05-24 | End: 2025-05-25

## 2025-05-24 RX ADMIN — SODIUM CHLORIDE, SODIUM LACTATE, POTASSIUM CHLORIDE, AND CALCIUM CHLORIDE 75 ML/HR: .6; .31; .03; .02 INJECTION, SOLUTION INTRAVENOUS at 09:00

## 2025-05-24 RX ADMIN — SODIUM CHLORIDE, SODIUM LACTATE, POTASSIUM CHLORIDE, AND CALCIUM CHLORIDE 75 ML/HR: .6; .31; .03; .02 INJECTION, SOLUTION INTRAVENOUS at 02:00

## 2025-05-24 RX ADMIN — BETAMETHASONE SODIUM PHOSPHATE AND BETAMETHASONE ACETATE 12 MG: 3; 3 INJECTION, SUSPENSION INTRA-ARTICULAR; INTRALESIONAL; INTRAMUSCULAR at 00:37

## 2025-05-24 RX ADMIN — NALBUPHINE HYDROCHLORIDE 10 MG: 10 INJECTION, SOLUTION INTRAMUSCULAR; INTRAVENOUS; SUBCUTANEOUS at 04:31

## 2025-05-24 RX ADMIN — PRENATAL VIT W/ FE FUMARATE-FA TAB 27-0.8 MG 1 TABLET: 27-0.8 TAB at 20:23

## 2025-05-24 RX ADMIN — NALBUPHINE HYDROCHLORIDE 10 MG: 10 INJECTION, SOLUTION INTRAMUSCULAR; INTRAVENOUS; SUBCUTANEOUS at 09:25

## 2025-05-24 SDOH — SOCIAL STABILITY: SOCIAL INSECURITY
WITHIN THE LAST YEAR, HAVE YOU BEEN RAPED OR FORCED TO HAVE ANY KIND OF SEXUAL ACTIVITY BY YOUR PARTNER OR EX-PARTNER?: NO

## 2025-05-24 SDOH — SOCIAL STABILITY: SOCIAL INSECURITY: WITHIN THE LAST YEAR, HAVE YOU BEEN HUMILIATED OR EMOTIONALLY ABUSED IN OTHER WAYS BY YOUR PARTNER OR EX-PARTNER?: NO

## 2025-05-24 SDOH — SOCIAL STABILITY: SOCIAL INSECURITY: HAVE YOU HAD ANY THOUGHTS OF HARMING ANYONE ELSE?: NO

## 2025-05-24 SDOH — HEALTH STABILITY: MENTAL HEALTH: NON-SPECIFIC ACTIVE SUICIDAL THOUGHTS (PAST 1 MONTH): NO

## 2025-05-24 SDOH — HEALTH STABILITY: MENTAL HEALTH: SUICIDAL BEHAVIOR (LIFETIME): NO

## 2025-05-24 SDOH — HEALTH STABILITY: MENTAL HEALTH: HAVE YOU USED ANY PRESCRIPTION DRUGS OTHER THAN PRESCRIBED IN THE PAST 12 MONTHS?: NO

## 2025-05-24 SDOH — ECONOMIC STABILITY: HOUSING INSECURITY: DO YOU FEEL UNSAFE GOING BACK TO THE PLACE WHERE YOU ARE LIVING?: NO

## 2025-05-24 SDOH — HEALTH STABILITY: MENTAL HEALTH: WISH TO BE DEAD (PAST 1 MONTH): NO

## 2025-05-24 SDOH — ECONOMIC STABILITY: FOOD INSECURITY: WITHIN THE PAST 12 MONTHS, THE FOOD YOU BOUGHT JUST DIDN'T LAST AND YOU DIDN'T HAVE MONEY TO GET MORE.: SOMETIMES TRUE

## 2025-05-24 SDOH — SOCIAL STABILITY: SOCIAL INSECURITY
WITHIN THE LAST YEAR, HAVE YOU BEEN KICKED, HIT, SLAPPED, OR OTHERWISE PHYSICALLY HURT BY YOUR PARTNER OR EX-PARTNER?: NO

## 2025-05-24 SDOH — SOCIAL STABILITY: SOCIAL INSECURITY: ARE YOU OR HAVE YOU BEEN THREATENED OR ABUSED PHYSICALLY, EMOTIONALLY, OR SEXUALLY BY ANYONE?: NO

## 2025-05-24 SDOH — ECONOMIC STABILITY: TRANSPORTATION INSECURITY: IN THE PAST 12 MONTHS, HAS LACK OF TRANSPORTATION KEPT YOU FROM MEDICAL APPOINTMENTS OR FROM GETTING MEDICATIONS?: NO

## 2025-05-24 SDOH — SOCIAL STABILITY: SOCIAL INSECURITY: ABUSE SCREEN: ADULT

## 2025-05-24 SDOH — SOCIAL STABILITY: SOCIAL INSECURITY: HAS ANYONE EVER THREATENED TO HURT YOUR FAMILY OR YOUR PETS?: NO

## 2025-05-24 SDOH — ECONOMIC STABILITY: FOOD INSECURITY
WITHIN THE PAST 12 MONTHS, YOU WORRIED THAT YOUR FOOD WOULD RUN OUT BEFORE YOU GOT THE MONEY TO BUY MORE.: SOMETIMES TRUE

## 2025-05-24 SDOH — SOCIAL STABILITY: SOCIAL INSECURITY: WITHIN THE LAST YEAR, HAVE YOU BEEN AFRAID OF YOUR PARTNER OR EX-PARTNER?: NO

## 2025-05-24 SDOH — ECONOMIC STABILITY: FOOD INSECURITY: HOW HARD IS IT FOR YOU TO PAY FOR THE VERY BASICS LIKE FOOD, HOUSING, MEDICAL CARE, AND HEATING?: HARD

## 2025-05-24 SDOH — SOCIAL STABILITY: SOCIAL INSECURITY: DO YOU FEEL ANYONE HAS EXPLOITED OR TAKEN ADVANTAGE OF YOU FINANCIALLY OR OF YOUR PERSONAL PROPERTY?: NO

## 2025-05-24 SDOH — HEALTH STABILITY: MENTAL HEALTH: CURRENT SMOKER: 0

## 2025-05-24 SDOH — SOCIAL STABILITY: SOCIAL INSECURITY: VERBAL ABUSE: DENIES

## 2025-05-24 SDOH — HEALTH STABILITY: MENTAL HEALTH: WERE YOU ABLE TO COMPLETE ALL THE BEHAVIORAL HEALTH SCREENINGS?: YES

## 2025-05-24 SDOH — SOCIAL STABILITY: SOCIAL INSECURITY: DOES ANYONE TRY TO KEEP YOU FROM HAVING/CONTACTING OTHER FRIENDS OR DOING THINGS OUTSIDE YOUR HOME?: NO

## 2025-05-24 SDOH — SOCIAL STABILITY: SOCIAL INSECURITY: HAVE YOU HAD THOUGHTS OF HARMING ANYONE ELSE?: NO

## 2025-05-24 SDOH — SOCIAL STABILITY: SOCIAL INSECURITY: ARE THERE ANY APPARENT SIGNS OF INJURIES/BEHAVIORS THAT COULD BE RELATED TO ABUSE/NEGLECT?: NO

## 2025-05-24 SDOH — SOCIAL STABILITY: SOCIAL INSECURITY: PHYSICAL ABUSE: DENIES

## 2025-05-24 SDOH — HEALTH STABILITY: MENTAL HEALTH: HAVE YOU USED ANY SUBSTANCES (CANABIS, COCAINE, HEROIN, HALLUCINOGENS, INHALANTS, ETC.) IN THE PAST 12 MONTHS?: NO

## 2025-05-24 ASSESSMENT — PAIN SCALES - GENERAL
PAINLEVEL_OUTOF10: 2
PAINLEVEL_OUTOF10: 3
PAINLEVEL_OUTOF10: 6
PAINLEVEL_OUTOF10: 0 - NO PAIN
PAINLEVEL_OUTOF10: 2
PAINLEVEL_OUTOF10: 0 - NO PAIN
PAINLEVEL_OUTOF10: 7
PAINLEVEL_OUTOF10: 8
PAINLEVEL_OUTOF10: 8
PAINLEVEL_OUTOF10: 4
PAINLEVEL_OUTOF10: 4

## 2025-05-24 ASSESSMENT — PATIENT HEALTH QUESTIONNAIRE - PHQ9
SUM OF ALL RESPONSES TO PHQ9 QUESTIONS 1 & 2: 0
1. LITTLE INTEREST OR PLEASURE IN DOING THINGS: NOT AT ALL
2. FEELING DOWN, DEPRESSED OR HOPELESS: NOT AT ALL

## 2025-05-24 ASSESSMENT — PAIN - FUNCTIONAL ASSESSMENT
PAIN_FUNCTIONAL_ASSESSMENT: 0-10

## 2025-05-24 ASSESSMENT — ACTIVITIES OF DAILY LIVING (ADL)
LACK_OF_TRANSPORTATION: YES
LACK_OF_TRANSPORTATION: YES

## 2025-05-24 ASSESSMENT — PAIN DESCRIPTION - DESCRIPTORS
DESCRIPTORS: SHARP;TIGHTNESS
DESCRIPTORS: SHARP;TIGHTNESS

## 2025-05-24 ASSESSMENT — LIFESTYLE VARIABLES
AUDIT-C TOTAL SCORE: 0
AUDIT-C TOTAL SCORE: 0
HOW OFTEN DO YOU HAVE A DRINK CONTAINING ALCOHOL: NEVER
SKIP TO QUESTIONS 9-10: 1
HOW MANY STANDARD DRINKS CONTAINING ALCOHOL DO YOU HAVE ON A TYPICAL DAY: PATIENT DOES NOT DRINK
HOW OFTEN DO YOU HAVE 6 OR MORE DRINKS ON ONE OCCASION: NEVER

## 2025-05-24 ASSESSMENT — PAIN DESCRIPTION - LOCATION: LOCATION: ABDOMEN

## 2025-05-24 NOTE — SIGNIFICANT EVENT
"Labor Progress Note    Subjective: To bedside for cervical recheck.     Objective:  Vitals: /65   Pulse (!) 132   Temp 36.6 °C (97.9 °F) (Oral)   Resp 18   Ht 1.549 m (5' 0.98\")   Wt 80.6 kg (177 lb 11.1 oz)   SpO2 99%   BMI 33.59 kg/m²     SVE: 2/50/-3 (unchanged)   Twin A  CEFM: Baseline- 135bpm, mod variability, + accels, - decel  Twin B:   CEFM: Baseline- 140bpm, mod variability, - accels, -decel  [place]: ctx q5-9min     Assessment/Plan:    PTL   -patient remains comfortable. Sleeping throughout the day. Ctx continue to space   -Exam unchanged   -Patient stable for transfer to [facility]     Mo-Di Twins  Fetal Status   -CEFM remains Cat I  -s/p BMZ #1. BMZ # 2 planned for 0040 (5/25)   -GBS neg     D/w Dr. Trevino & Dr. Sung Aguayo MD  OBGYN, PGY-1       "

## 2025-05-24 NOTE — ANESTHESIA PREPROCEDURE EVALUATION
Patient: Suly Olson    Evaluation Method: In-person visit    Procedure Information       Date/Time: 25 0900    Procedure:  SECTION, MULTIPLE FETUSES, 2 FETUSES    Location: [facility] / Virtual [facility] OB    Surgeons: Shanell Jarvis MD          20yF @ 33w6d w mono di twins    Relevant Problems   Neuro   (+) Depression affecting pregnancy in second trimester, antepartum ([facility])      GYN   (+) 33 weeks gestation of pregnancy ([facility])   (+) Monochorionic diamniotic twin gestation in second trimester ([facility])       Clinical information reviewed:   Tobacco  Allergies  Meds   Med Hx  Surg Hx   Fam Hx  Soc Hx        NPO Detail:  NPO/Void Status  Date of Last Liquid: 25  Time of Last Liquid:   Date of Last Solid: 25  Time of Last Solid:          OB/Gyn Evaluation    Present Pregnancy    Patient is pregnant now.   Obstetric History                Physical Exam    Airway  Mallampati: III  TM distance: >3 FB  Neck ROM: full  Mouth opening: 3 or more finger widths     Cardiovascular   Rhythm: regular  Rate: normal     Dental - normal exam     Pulmonary - normal exam   Abdominal          Results for orders placed or performed in visit on 25 (from the past 24 hours)   CBC   Result Value Ref Range    WBC 4.4 4.4 - 11.3 x10*3/uL    nRBC 0.7 (H) 0.0 - 0.0 /100 WBCs    RBC 4.01 4.00 - 5.20 x10*6/uL    Hemoglobin 9.8 (L) 12.0 - 16.0 g/dL    Hematocrit 31.4 (L) 36.0 - 46.0 %    MCV 78 (L) 80 - 100 fL    MCH 24.4 (L) 26.0 - 34.0 pg    MCHC 31.2 (L) 32.0 - 36.0 g/dL    RDW 14.4 11.5 - 14.5 %    Platelets 189 150 - 450 x10*3/uL        Anesthesia Plan    History of general anesthesia?: no  History of complications of general anesthesia?: unknown/emergency    ASA 2     CSE     The patient is not a current smoker.    Anesthetic plan and risks discussed with patient.  Use of blood products discussed with patient who consented to blood products.    Plan discussed with attending and  resident.

## 2025-05-24 NOTE — PROGRESS NOTES
Intrapartum Progress Note    Assessment/Plan   Suly Olson is a 20 y.o.  at 33w6d. GIS[place]: 2025, admitted from triage for PTL.    PTL   -pt continues to contract consistently (q3-7mins) but has spaced as compared to frequency on arrival   -SVE initially 1cm on arrival, progressed to 2/50/-3 and has remained stalled at that exam over multiple repeat checks  -patient has scheduled CS tomorrow. BMZ not previously given  in the outpatient setting in preparation for this delivery  -BMZ x 1 administered. For #2 at 0040 on .   -Will delay delivery until BMZ complete as long as maternal/fetal status remains stable.    Mo-Di Twins   Fetal Status   -Last US:  Twin A EFW: 1686 g (9%), AC (17%), normal dopplers, Twin B EFW: 1721 g (12%), AC (35%) high normal dopplers   -CEFM, currently Cat I   -GBS neg  -BMZ as above    Pregnancy Notables   - FGR of Twin A  - Depression  - Anemia 9.8, T&C x1   - Abnormal 1 hr 152, no 3 hr GTT    Contraception Plan: undecided (also currently in same sex relationship)    D/w Dr. Uriel Aguayo MD  OBGYN, PGY-1   Assessment & Plan  Indication for care or intervention in labor or delivery ([facility])    33 weeks gestation of pregnancy ([facility])    Monochorionic diamniotic twin gestation in second trimester ([facility])    Fetal growth restriction antepartum ([facility])    Pregnancy Problems (from 25 to present)       Problem Noted Diagnosed Resolved    Indication for care or intervention in labor or delivery ([facility]) 2025 by Do Mahmood MD  No    Priority:  Medium       Fetal growth restriction antepartum ([facility]) 2025 by Camille Neumann MD  No    Priority:  Medium       Overview Addendum 2025  4:00 PM by Camille Neumann MD   25: FGR of Twin A with normal dopplers. Normal growth of Twin B, though elevated dopplers   [x] twice weekly  testing   [] delivery at 34 weeks         33 weeks gestation of pregnancy ([facility]) 2025 by Camille Neumann MD  No     Priority:  Medium       Overview Addendum 2025  3:53 PM by Camille Neumann MD   Desired provider in labor: [] CNM  [x] Physician  [x] Initial BMI: cannot be calculated   [x] Prenatal Labs: WNL, in media tab  [x] Rh status: positive  [x] Genetic Screening:  risk reducing Tab cfDNA  [x] Pregnancy dated by: first trimester US, report/images not available     [x] Anatomy US: incomplete on   [x] 1hr GCT at 24-28wks: elevated 1 hr, normal 3 hr  [x] Fetal Surveillance (if indicated): weekly at 32 wks  [x] Tdap (27-36wks): given      [x] Breastfeeding: breast pump Rx given  [x] Postpartum Birth control method: currently in same sex relationship & undecided on birth control  [x] GBS at 36 - 37 wks: collected   [x] 39 weeks discussion of IOL vs. Expectant management: 34 weeks  [x] Mode of delivery ( anticipated ): desires primary             Monochorionic diamniotic twin gestation in second trimester ([facility]) 2025 by Camille Neumann MD  No    Priority:  Medium       Overview Addendum 2025  3:53 PM by Camille Neumann MD   [x] fetal echos - LOC 0 x2  [x] genetics - rr cfDNA  [x] q2 week TTTS screening starting at 16 weeks   [] delivery 34-36wks              Depression affecting pregnancy in second trimester, antepartum ([facility]) 2025 by Camille Neumann MD  No    Priority:  Medium               Subjective   Patient continues with consistent contractions but otherwise is doing well. No vb, lof, or dfm    Objective   Last Vitals:  Temp Pulse Resp BP MAP Pulse Ox   36.6 °C (97.9 °F) 82 18 109/56 77 100 %     Physical Examination:  General Appearance: no acute distress, sleeping comfortably  Lungs: normal work of breathing   Heart: warm, well perfuse  Abdomen: gravid  Psych exam: sleeping    Twin A   CEFM: Baseline- 130bpm, mod variability, + accels, -decel  Twin B  CEFM: Baseline- 135bpm, mod variability, + accels, - decel  [place]: ctx q3-7min     Lab Review:  Labs in chart were reviewed.

## 2025-05-24 NOTE — H&P
OB Triage H&P    Assessment/Plan    Suly Olson is a 20 y.o.  at 33w5d by LMP c/w 19wk US who presents to triage with contractions.    Plan  Contractions   -Dioni q1-5 min on toco   -1cm on cervical exam, per patient was 1cm 1 week ago as well. On 2hr recheck patient is 2cm (performed by different examiner).   -Given cervical change recommend proceeding with  birth. However, patient has not yet received BMZ. Will give BMZ now and plan for recheck. If patient continues to progress will move toward delivery. Otherwise, will continue to monitor for cervical change during BMZ window.     Mo-Di Twin Pregnancy   Fetal Status   -Fetal monitoring reassuring in both fetus   -CEFM, currently cat 1 in both fetus   -BMZ given at 0040, for next dose in 24hrs if still pregnant   -Good fetal movement  -EFW: Last US:  Twin A EFW: 1686 g 9%,AC17%, normal dopplers, Twin BEFW: 1721 g 12%, AC 35% high normal dopplers   -Cephalic/Transverse on BSUS     Discussed plan and reviewed with: Dr. Erma Rodriguez MD  PGY1, Obstetrics and Gynecology      Subjective   20 y.o.  at 33w5d by LMP c/w 19wk US who presents to triage with contractions. Patient reports she was getting an NST this morning and contractions were noted on her NST. She reports earlier in the day her contractions were about 10 min apart. They are now closer together and more intense. Reports contractions were approx 4min apart before leaving for the hospital. Good fetal movement. Denies LOF or VB.     Pregnancy Notables:   - Mo-di twin pregnancy: planned to be delivered at 34 wks  - FGR of Twin A  EFW 1686 g 9%, normal dopplers; Twin B normally grown (12%ile), high normal dopplers   - Depression, no meds   - Anemia 9.8, T&C x1  - Abnormal 1 hr 152, no 3 hr GTT      Obhx: G1  Gyn Hx: denies recent STI   PMH: denies  PSH: denies  Med: none   Allergies: none   Social: denies t/a/d       Prenatal Provider - KRISTEL Kendrick    OB History     Para Term  AB Living   1 0 0 0 0 0   SAB IAB Ectopic Multiple Live Births   0 0 0 0 0      # Outcome Date GA Lbr Booker/2nd Weight Sex Type Anes PTL Lv   1 Current                Surgical History[1]    Social History     Tobacco Use    Smoking status: Never    Smokeless tobacco: Never   Substance Use Topics    Alcohol use: Never       Allergies[2]    Prescriptions Prior to Admission[3]  Objective     Last Vitals  Temp Pulse Resp BP MAP O2 Sat   36.5 °C (97.7 °F) 96 16 124/84 99 100 %     Blood Pressures         2025  2220 2025  0124          BP: 121/78 124/84               Physical Exam  General: NAD, mood appropriate  Cardiopulmonary: warm and well perfused, breathing comfortably on room air  Abdomen: Gravid, non-tender  Extremities: Symmetric  Speculum Exam: deferred  Cervix: /-3    Chaperone Present: Yes.  Chaperone Name/Title: Bedside RN  Examination Chaperoned: Gynecological Exam     Fetal Monitoring  A: 135-140/mod/+accel/-decel  B: tracing discontinuous,  145/mod/possible accelerations although difficult to interpret given broken tracing/-decels   [place]: q1-5min     Interpretation: Category 1 for both fetus     Bedside ultrasound: Yes    Labs in chart were reviewed.   Results from last 7 days   Lab Units 25  0029 25  1259   WBC AUTO x10*3/uL 5.9 4.4   HEMOGLOBIN g/dL 10.7* 9.8*   HEMATOCRIT % 35.3* 31.4*   PLATELETS AUTO x10*3/uL 209 189        Prenatal labs reviewed, not remarkable.             [1] History reviewed. No pertinent surgical history.  [2] No Known Allergies  [3]   Medications Prior to Admission   Medication Sig Dispense Refill Last Dose/Taking    acetaminophen (Tylenol) 500 mg tablet Take by mouth every 6 hours if needed for mild pain (1 - 3).   2025    ferrous sulfate 325 mg (65 mg elemental) tablet Take 1 tablet (325 mg) by mouth once daily. 90 tablet 3 Unknown    prenatal vitamin, iron-folic, 27 mg iron-800 mcg folic acid tablet Take 1 tablet by  mouth once daily.   Unknown

## 2025-05-24 NOTE — SIGNIFICANT EVENT
To bedside for exam. Patient continues to feel contractions, more intense. Desires pain medications.    SVE: 2/50/-3  FHT: A: 130/mod/+accel/-decel  B: 135/mod/-accel/-decel  [place]: q1-4 min     Will give nubaine at this time for pain control.  Given unchanged cervical exam will continue to monitor to continue to get benefits of BMZ. Continue to monitor for cervical change. CEFM, currently cat 1 for both fetus.    Discussed with Dr. Jakob Rodriguez MD  PGY1, Obstetrics and Gynecology

## 2025-05-24 NOTE — CONSULTS
"M Consult    HPI:  Suly Olson is a 19 y/o  @ 33.5wga admitted from triage overnight for PTL.    Admission subjective:   Patient reports she was getting an NST this morning and contractions were noted on her NST. She reports earlier in the day her contractions were about 10 min apart. They are now closer together and more intense. Reports contractions were approx 4min apart before leaving for the hospital. Good fetal movement. Denies LOF or VB.     Currently the patient is doing well. Reports contractions have continued to space. Denies vb, lof, or dfm. Overall much more comfortable     Pregnancy Notables  -Mo-di twin pregnancy: planned to be delivered at 34 wks for FGR  - Depression, no meds   - Anemia 9.8, T&C x1  - Abnormal 1 hr 152, nml 3 hr GTT    Obhx: G1  Gyn Hx: denies recent STI   PMH: denies  PSH: denies  Med: none   Allergies: none   Social: denies t/a/d     Allergies  RX Allergies[1]    Medications  Prescriptions Prior to Admission[2]    OBJECTIVE:   /65   Pulse 104   Temp 36.6 °C (97.9 °F) (Oral)   Resp 18   Ht 1.549 m (5' 0.98\")   Wt 80.6 kg (177 lb 11.1 oz)   SpO2 99%   BMI 33.59 kg/m²    Temp  Min: 36.4 °C (97.5 °F)  Max: 37 °C (98.6 °F)  Pulse  Min: 82  Max: 132  BP  Min: 109/56  Max: 125/56    Physical exam:  General:  AAOx3, No acute distress  Cardiovascular: Warm and well perfused  Respiratory: Normal respiratory effort   Abdominal:  Soft, gravid    Twin A   CEFM: Baseline- 130bpm, mod variability, + accels, -decel  Twin B  CEFM: Baseline- 135bpm, mod variability, + accels, - decel  [place]: ctx q3-7min     ASSESSMENT AND PLAN:     20 y.o.  at 33w6d with Mo-Di Twins admitted for PTL.    PTL   -pt continues to contract consistently (q3-7mins) but has spaced as compared to frequency on arrival   -SVE initially 1cm on arrival, progressed to 2/50/-3 and has remained stalled at that exam over multiple repeat checks  -patient has scheduled CS tomorrow. BMZ not previously given  in " the outpatient setting in preparation for this delivery  -BMZ x 1 administered. For #2 at 0040 on 5/25.   -Will delay delivery until BMZ complete as long as maternal/fetal status remains stable.     Mo-Di Twins   Fetal Status   -Last US: 5/14 Twin A EFW: 1686 g (9%), AC (17%), normal dopplers, Twin B EFW: 1721 g (12%), AC (35%) high normal dopplers   -CEFM, currently Cat I   -GBS neg  -BMZ as above  -NICU cs placed today. Team aware     Routine:  - GBS neg   - TDAP 5/1/25  - Flu 1/24/25  - 1hr abnml, nml 3hr  - BCM: undecided (also currently in same sex relationship    Dispo: Remain inpatient until delivery after BMZ complete. Sooner pending maternal/fetal status    Pt seen and discussed with MFM Attending, .   Andre Aguayo MD  OBGYN, PGY-1   Assessment & Plan  Indication for care or intervention in labor or delivery ([facility])    33 weeks gestation of pregnancy ([facility])    Monochorionic diamniotic twin gestation in second trimester ([facility])    Fetal growth restriction antepartum ([facility])             [1] No Known Allergies  [2]   Medications Prior to Admission   Medication Sig Dispense Refill Last Dose/Taking    acetaminophen (Tylenol) 500 mg tablet Take by mouth every 6 hours if needed for mild pain (1 - 3).   5/22/2025    ferrous sulfate 325 mg (65 mg elemental) tablet Take 1 tablet (325 mg) by mouth once daily. 90 tablet 3 Unknown    prenatal vitamin, iron-folic, 27 mg iron-800 mcg folic acid tablet Take 1 tablet by mouth once daily.   Unknown

## 2025-05-25 ENCOUNTER — ANESTHESIA (OUTPATIENT)
Dept: OBSTETRICS AND GYNECOLOGY | Facility: HOSPITAL | Age: 20
End: 2025-05-25
Payer: MEDICAID

## 2025-05-25 ENCOUNTER — PREP FOR PROCEDURE (OUTPATIENT)
Dept: OBSTETRICS AND GYNECOLOGY | Facility: HOSPITAL | Age: 20
End: 2025-05-25

## 2025-05-25 VITALS
TEMPERATURE: 98.1 F | HEIGHT: 61 IN | BODY MASS INDEX: 33.55 KG/M2 | RESPIRATION RATE: 18 BRPM | DIASTOLIC BLOOD PRESSURE: 64 MMHG | OXYGEN SATURATION: 98 % | SYSTOLIC BLOOD PRESSURE: 119 MMHG | HEART RATE: 85 BPM | WEIGHT: 177.69 LBS

## 2025-05-25 PROCEDURE — 59025 FETAL NON-STRESS TEST: CPT

## 2025-05-25 PROCEDURE — 2500000004 HC RX 250 GENERAL PHARMACY W/ HCPCS (ALT 636 FOR OP/ED): Mod: JZ,SE

## 2025-05-25 PROCEDURE — 88307 TISSUE EXAM BY PATHOLOGIST: CPT | Mod: TC,SUR | Performed by: STUDENT IN AN ORGANIZED HEALTH CARE EDUCATION/TRAINING PROGRAM

## 2025-05-25 PROCEDURE — 7100000016 HC LABOR RECOVERY PER HOUR: Performed by: STUDENT IN AN ORGANIZED HEALTH CARE EDUCATION/TRAINING PROGRAM

## 2025-05-25 PROCEDURE — 7100000016 HC LABOR RECOVERY PER HOUR: Performed by: OBSTETRICS & GYNECOLOGY

## 2025-05-25 PROCEDURE — 59025 FETAL NON-STRESS TEST: CPT | Mod: 51,GC

## 2025-05-25 PROCEDURE — 2500000004 HC RX 250 GENERAL PHARMACY W/ HCPCS (ALT 636 FOR OP/ED): Mod: SE | Performed by: STUDENT IN AN ORGANIZED HEALTH CARE EDUCATION/TRAINING PROGRAM

## 2025-05-25 PROCEDURE — 2500000004 HC RX 250 GENERAL PHARMACY W/ HCPCS (ALT 636 FOR OP/ED): Mod: SE

## 2025-05-25 PROCEDURE — 01961 ANES CESAREAN DELIVERY ONLY: CPT | Performed by: ANESTHESIOLOGY

## 2025-05-25 PROCEDURE — 2500000001 HC RX 250 WO HCPCS SELF ADMINISTERED DRUGS (ALT 637 FOR MEDICARE OP): Mod: SE

## 2025-05-25 PROCEDURE — 1210000001 HC SEMI-PRIVATE ROOM DAILY

## 2025-05-25 PROCEDURE — 2720000007 HC OR 272 NO HCPCS: Performed by: OBSTETRICS & GYNECOLOGY

## 2025-05-25 PROCEDURE — 59514 CESAREAN DELIVERY ONLY: CPT | Performed by: STUDENT IN AN ORGANIZED HEALTH CARE EDUCATION/TRAINING PROGRAM

## 2025-05-25 PROCEDURE — 3700000014 HC AN EPIDURAL BLOCK CHARGE: Performed by: OBSTETRICS & GYNECOLOGY

## 2025-05-25 PROCEDURE — 3700000014 HC AN EPIDURAL BLOCK CHARGE: Performed by: STUDENT IN AN ORGANIZED HEALTH CARE EDUCATION/TRAINING PROGRAM

## 2025-05-25 PROCEDURE — 2720000007 HC OR 272 NO HCPCS: Performed by: STUDENT IN AN ORGANIZED HEALTH CARE EDUCATION/TRAINING PROGRAM

## 2025-05-25 PROCEDURE — 99232 SBSQ HOSP IP/OBS MODERATE 35: CPT

## 2025-05-25 PROCEDURE — 59514 CESAREAN DELIVERY ONLY: CPT | Performed by: OBSTETRICS & GYNECOLOGY

## 2025-05-25 PROCEDURE — 2500000005 HC RX 250 GENERAL PHARMACY W/O HCPCS: Mod: SE | Performed by: STUDENT IN AN ORGANIZED HEALTH CARE EDUCATION/TRAINING PROGRAM

## 2025-05-25 PROCEDURE — 59514 CESAREAN DELIVERY ONLY: CPT

## 2025-05-25 RX ORDER — OXYTOCIN/0.9 % SODIUM CHLORIDE 30/500 ML
60 PLASTIC BAG, INJECTION (ML) INTRAVENOUS ONCE AS NEEDED
Status: DISCONTINUED | OUTPATIENT
Start: 2025-05-25 | End: 2025-05-30 | Stop reason: HOSPADM

## 2025-05-25 RX ORDER — KETOROLAC TROMETHAMINE 30 MG/ML
30 INJECTION, SOLUTION INTRAMUSCULAR; INTRAVENOUS EVERY 6 HOURS
Status: COMPLETED | OUTPATIENT
Start: 2025-05-25 | End: 2025-05-26

## 2025-05-25 RX ORDER — BUPIVACAINE HYDROCHLORIDE 7.5 MG/ML
INJECTION INTRAVENOUS AS NEEDED
Status: DISCONTINUED | OUTPATIENT
Start: 2025-05-25 | End: 2025-05-25

## 2025-05-25 RX ORDER — PHENYLEPHRINE HCL IN 0.9% NACL 1 MG/10 ML
SYRINGE (ML) INTRAVENOUS AS NEEDED
Status: DISCONTINUED | OUTPATIENT
Start: 2025-05-25 | End: 2025-05-25

## 2025-05-25 RX ORDER — HYDROMORPHONE HYDROCHLORIDE 0.2 MG/ML
0.2 INJECTION INTRAMUSCULAR; INTRAVENOUS; SUBCUTANEOUS EVERY 5 MIN PRN
Status: DISCONTINUED | OUTPATIENT
Start: 2025-05-25 | End: 2025-05-25 | Stop reason: HOSPADM

## 2025-05-25 RX ORDER — LABETALOL HYDROCHLORIDE 5 MG/ML
20 INJECTION, SOLUTION INTRAVENOUS ONCE AS NEEDED
Status: DISCONTINUED | OUTPATIENT
Start: 2025-05-25 | End: 2025-05-30 | Stop reason: HOSPADM

## 2025-05-25 RX ORDER — HYDROMORPHONE HYDROCHLORIDE 0.2 MG/ML
0.2 INJECTION INTRAMUSCULAR; INTRAVENOUS; SUBCUTANEOUS EVERY 5 MIN PRN
Status: DISCONTINUED | OUTPATIENT
Start: 2025-05-25 | End: 2025-05-30 | Stop reason: HOSPADM

## 2025-05-25 RX ORDER — HYDROMORPHONE HYDROCHLORIDE 1 MG/ML
0.4 INJECTION, SOLUTION INTRAMUSCULAR; INTRAVENOUS; SUBCUTANEOUS EVERY 5 MIN PRN
Status: DISCONTINUED | OUTPATIENT
Start: 2025-05-25 | End: 2025-05-25 | Stop reason: HOSPADM

## 2025-05-25 RX ORDER — PHENYLEPHRINE 10 MG/250 ML(40 MCG/ML)IN 0.9 % SOD.CHLORIDE INTRAVENOUS
CONTINUOUS PRN
Status: DISCONTINUED | OUTPATIENT
Start: 2025-05-25 | End: 2025-05-25

## 2025-05-25 RX ORDER — POLYETHYLENE GLYCOL 3350 17 G/17G
17 POWDER, FOR SOLUTION ORAL 2 TIMES DAILY PRN
Status: DISCONTINUED | OUTPATIENT
Start: 2025-05-25 | End: 2025-05-29

## 2025-05-25 RX ORDER — SIMETHICONE 80 MG
80 TABLET,CHEWABLE ORAL 4 TIMES DAILY PRN
Status: DISCONTINUED | OUTPATIENT
Start: 2025-05-25 | End: 2025-05-29

## 2025-05-25 RX ORDER — MISOPROSTOL 200 UG/1
800 TABLET ORAL ONCE AS NEEDED
Status: DISCONTINUED | OUTPATIENT
Start: 2025-05-25 | End: 2025-05-30 | Stop reason: HOSPADM

## 2025-05-25 RX ORDER — METHYLERGONOVINE MALEATE 0.2 MG/ML
0.2 INJECTION INTRAVENOUS ONCE AS NEEDED
Status: DISCONTINUED | OUTPATIENT
Start: 2025-05-25 | End: 2025-05-30 | Stop reason: HOSPADM

## 2025-05-25 RX ORDER — ONDANSETRON 4 MG/1
4 TABLET, FILM COATED ORAL EVERY 6 HOURS PRN
Status: DISCONTINUED | OUTPATIENT
Start: 2025-05-25 | End: 2025-05-30 | Stop reason: HOSPADM

## 2025-05-25 RX ORDER — NORETHINDRONE AND ETHINYL ESTRADIOL 0.5-0.035
KIT ORAL AS NEEDED
Status: DISCONTINUED | OUTPATIENT
Start: 2025-05-25 | End: 2025-05-25

## 2025-05-25 RX ORDER — DIPHENHYDRAMINE HCL 25 MG
25 CAPSULE ORAL EVERY 4 HOURS PRN
Status: DISCONTINUED | OUTPATIENT
Start: 2025-05-25 | End: 2025-05-30 | Stop reason: HOSPADM

## 2025-05-25 RX ORDER — OXYCODONE HYDROCHLORIDE 5 MG/1
5 TABLET ORAL EVERY 4 HOURS PRN
Status: DISCONTINUED | OUTPATIENT
Start: 2025-05-26 | End: 2025-05-30 | Stop reason: HOSPADM

## 2025-05-25 RX ORDER — DIPHENHYDRAMINE HYDROCHLORIDE 50 MG/ML
25 INJECTION, SOLUTION INTRAMUSCULAR; INTRAVENOUS EVERY 4 HOURS PRN
Status: DISCONTINUED | OUTPATIENT
Start: 2025-05-25 | End: 2025-05-30 | Stop reason: HOSPADM

## 2025-05-25 RX ORDER — KETOROLAC TROMETHAMINE 30 MG/ML
INJECTION, SOLUTION INTRAMUSCULAR; INTRAVENOUS AS NEEDED
Status: DISCONTINUED | OUTPATIENT
Start: 2025-05-25 | End: 2025-05-25

## 2025-05-25 RX ORDER — MAGNESIUM SULFATE HEPTAHYDRATE 40 MG/ML
2 INJECTION, SOLUTION INTRAVENOUS ONCE
Status: DISCONTINUED | OUTPATIENT
Start: 2025-05-25 | End: 2025-05-25 | Stop reason: HOSPADM

## 2025-05-25 RX ORDER — PHENYLEPHRINE HCL IN 0.9% NACL 0.4MG/10ML
SYRINGE (ML) INTRAVENOUS AS NEEDED
Status: DISCONTINUED | OUTPATIENT
Start: 2025-05-25 | End: 2025-05-25

## 2025-05-25 RX ORDER — LOPERAMIDE HYDROCHLORIDE 2 MG/1
4 CAPSULE ORAL EVERY 2 HOUR PRN
Status: DISCONTINUED | OUTPATIENT
Start: 2025-05-25 | End: 2025-05-30 | Stop reason: HOSPADM

## 2025-05-25 RX ORDER — OXYCODONE HYDROCHLORIDE 5 MG/1
10 TABLET ORAL EVERY 4 HOURS PRN
Status: DISCONTINUED | OUTPATIENT
Start: 2025-05-26 | End: 2025-05-30 | Stop reason: HOSPADM

## 2025-05-25 RX ORDER — ENOXAPARIN SODIUM 100 MG/ML
40 INJECTION SUBCUTANEOUS EVERY 24 HOURS
Status: DISCONTINUED | OUTPATIENT
Start: 2025-05-26 | End: 2025-05-30 | Stop reason: HOSPADM

## 2025-05-25 RX ORDER — LIDOCAINE 560 MG/1
1 PATCH PERCUTANEOUS; TOPICAL; TRANSDERMAL
Status: DISCONTINUED | OUTPATIENT
Start: 2025-05-25 | End: 2025-05-30 | Stop reason: HOSPADM

## 2025-05-25 RX ORDER — ADHESIVE BANDAGE
10 BANDAGE TOPICAL
Status: DISCONTINUED | OUTPATIENT
Start: 2025-05-25 | End: 2025-05-30 | Stop reason: HOSPADM

## 2025-05-25 RX ORDER — IBUPROFEN 600 MG/1
600 TABLET, FILM COATED ORAL EVERY 6 HOURS
Status: DISCONTINUED | OUTPATIENT
Start: 2025-05-26 | End: 2025-05-30 | Stop reason: HOSPADM

## 2025-05-25 RX ORDER — ACETAMINOPHEN 325 MG/1
975 TABLET ORAL EVERY 6 HOURS
Status: DISCONTINUED | OUTPATIENT
Start: 2025-05-25 | End: 2025-05-30 | Stop reason: HOSPADM

## 2025-05-25 RX ORDER — OXYTOCIN 10 [USP'U]/ML
10 INJECTION, SOLUTION INTRAMUSCULAR; INTRAVENOUS ONCE AS NEEDED
Status: DISCONTINUED | OUTPATIENT
Start: 2025-05-25 | End: 2025-05-30 | Stop reason: HOSPADM

## 2025-05-25 RX ORDER — TRANEXAMIC ACID 1 G/10ML
1000 INJECTION, SOLUTION INTRAVENOUS ONCE AS NEEDED
Status: DISCONTINUED | OUTPATIENT
Start: 2025-05-25 | End: 2025-05-28

## 2025-05-25 RX ORDER — ONDANSETRON HYDROCHLORIDE 2 MG/ML
4 INJECTION, SOLUTION INTRAVENOUS EVERY 6 HOURS PRN
Status: DISCONTINUED | OUTPATIENT
Start: 2025-05-25 | End: 2025-05-30 | Stop reason: HOSPADM

## 2025-05-25 RX ORDER — NALOXONE HYDROCHLORIDE 0.4 MG/ML
0.1 INJECTION, SOLUTION INTRAMUSCULAR; INTRAVENOUS; SUBCUTANEOUS EVERY 5 MIN PRN
Status: DISCONTINUED | OUTPATIENT
Start: 2025-05-25 | End: 2025-05-30 | Stop reason: HOSPADM

## 2025-05-25 RX ORDER — CARBOPROST TROMETHAMINE 250 UG/ML
250 INJECTION, SOLUTION INTRAMUSCULAR ONCE AS NEEDED
Status: DISCONTINUED | OUTPATIENT
Start: 2025-05-25 | End: 2025-05-30 | Stop reason: HOSPADM

## 2025-05-25 RX ORDER — FAMOTIDINE 10 MG/ML
INJECTION INTRAVENOUS AS NEEDED
Status: DISCONTINUED | OUTPATIENT
Start: 2025-05-25 | End: 2025-05-25

## 2025-05-25 RX ORDER — AZITHROMYCIN MONOHYDRATE 500 MG/5ML
INJECTION, POWDER, LYOPHILIZED, FOR SOLUTION INTRAVENOUS AS NEEDED
Status: DISCONTINUED | OUTPATIENT
Start: 2025-05-25 | End: 2025-05-25

## 2025-05-25 RX ORDER — OXYCODONE HYDROCHLORIDE 5 MG/1
5 TABLET ORAL ONCE AS NEEDED
Refills: 0 | Status: DISCONTINUED | OUTPATIENT
Start: 2025-05-25 | End: 2025-05-25 | Stop reason: HOSPADM

## 2025-05-25 RX ORDER — CEFAZOLIN 1 G/1
INJECTION, POWDER, FOR SOLUTION INTRAVENOUS AS NEEDED
Status: DISCONTINUED | OUTPATIENT
Start: 2025-05-25 | End: 2025-05-25

## 2025-05-25 RX ORDER — HYDRALAZINE HYDROCHLORIDE 20 MG/ML
5 INJECTION INTRAMUSCULAR; INTRAVENOUS ONCE AS NEEDED
Status: DISCONTINUED | OUTPATIENT
Start: 2025-05-25 | End: 2025-05-30 | Stop reason: HOSPADM

## 2025-05-25 RX ADMIN — PHENYLEPHRINE-NACL IV SOLUTION 10 MG/250ML-0.9% 0.62 MCG/KG/MIN: 10-0.9/25 SOLUTION at 11:59

## 2025-05-25 RX ADMIN — BUPIVACAINE HYDROCHLORIDE IN DEXTROSE 1.4 ML: 7.5 INJECTION, SOLUTION SUBARACHNOID at 11:59

## 2025-05-25 RX ADMIN — ONDANSETRON 4 MG: 2 INJECTION INTRAMUSCULAR; INTRAVENOUS at 00:21

## 2025-05-25 RX ADMIN — EPHEDRINE SULFATE 25 MG: 50 INJECTION INTRAVENOUS at 13:08

## 2025-05-25 RX ADMIN — CEFAZOLIN 2 G: 1 INJECTION, POWDER, FOR SOLUTION INTRAMUSCULAR; INTRAVENOUS at 12:16

## 2025-05-25 RX ADMIN — OXYTOCIN 600 MILLI-UNITS/MIN: 10 INJECTION, SOLUTION INTRAMUSCULAR; INTRAVENOUS at 12:30

## 2025-05-25 RX ADMIN — Medication 80 MCG: at 12:20

## 2025-05-25 RX ADMIN — Medication 160 MCG: at 12:03

## 2025-05-25 RX ADMIN — KETOROLAC TROMETHAMINE 15 MG: 30 INJECTION, SOLUTION INTRAMUSCULAR; INTRAVENOUS at 12:35

## 2025-05-25 RX ADMIN — FAMOTIDINE 10 MG: 10 INJECTION, SOLUTION INTRAVENOUS at 11:35

## 2025-05-25 RX ADMIN — KETOROLAC TROMETHAMINE 30 MG: 30 INJECTION, SOLUTION INTRAMUSCULAR; INTRAVENOUS at 19:11

## 2025-05-25 RX ADMIN — ONDANSETRON 4 MG: 2 INJECTION INTRAMUSCULAR; INTRAVENOUS at 11:35

## 2025-05-25 RX ADMIN — DEXAMETHASONE SODIUM PHOSPHATE 4 MG: 4 INJECTION, SOLUTION INTRAMUSCULAR; INTRAVENOUS at 12:35

## 2025-05-25 RX ADMIN — DIPHENHYDRAMINE HYDROCHLORIDE 25 MG: 25 CAPSULE ORAL at 19:11

## 2025-05-25 RX ADMIN — SODIUM CHLORIDE, POTASSIUM CHLORIDE, SODIUM LACTATE AND CALCIUM CHLORIDE 75 ML/HR: 600; 310; 30; 20 INJECTION, SOLUTION INTRAVENOUS at 09:50

## 2025-05-25 RX ADMIN — ACETAMINOPHEN 975 MG: 325 TABLET ORAL at 19:11

## 2025-05-25 RX ADMIN — FENTANYL CITRATE 15 MCG: 50 INJECTION, SOLUTION INTRAMUSCULAR; INTRAVENOUS at 11:59

## 2025-05-25 RX ADMIN — HYDROMORPHONE HYDROCHLORIDE 0.4 MG: 1 INJECTION, SOLUTION INTRAMUSCULAR; INTRAVENOUS; SUBCUTANEOUS at 14:16

## 2025-05-25 RX ADMIN — Medication 80 MCG: at 12:45

## 2025-05-25 RX ADMIN — MORPHINE SULFATE 0.15 MG: 0.5 INJECTION EPIDURAL; INTRATHECAL; INTRAVENOUS at 11:59

## 2025-05-25 RX ADMIN — SODIUM CHLORIDE, SODIUM LACTATE, POTASSIUM CHLORIDE, AND CALCIUM CHLORIDE: 600; 310; 30; 20 INJECTION, SOLUTION INTRAVENOUS at 11:40

## 2025-05-25 RX ADMIN — AZITHROMYCIN 500 MG: 500 INJECTION, POWDER, LYOPHILIZED, FOR SOLUTION INTRAVENOUS at 12:16

## 2025-05-25 RX ADMIN — BETAMETHASONE SODIUM PHOSPHATE AND BETAMETHASONE ACETATE 12 MG: 3; 3 INJECTION, SUSPENSION INTRA-ARTICULAR; INTRALESIONAL; INTRAMUSCULAR at 00:30

## 2025-05-25 RX ADMIN — Medication 120 MCG: at 12:32

## 2025-05-25 ASSESSMENT — PAIN SCALES - GENERAL
PAINLEVEL_OUTOF10: 0 - NO PAIN
PAINLEVEL_OUTOF10: 2
PAIN_LEVEL: 0
PAINLEVEL_OUTOF10: 5 - MODERATE PAIN
PAINLEVEL_OUTOF10: 0 - NO PAIN
PAINLEVEL_OUTOF10: 7
PAINLEVEL_OUTOF10: 4
PAINLEVEL_OUTOF10: 0 - NO PAIN

## 2025-05-25 ASSESSMENT — PAIN - FUNCTIONAL ASSESSMENT
PAIN_FUNCTIONAL_ASSESSMENT: 0-10

## 2025-05-25 NOTE — LACTATION NOTE
This note was copied from a baby's chart.  Lactation Consultant Note  Lactation Consultation  Reason for Consult: Initial assessment, NICU baby, Infant < 6lbs, Multiple gestation  Consultant Name: Sasha Cm, RN, IBCLC    Maternal Information  Has mother  before?: No  Infant to breast within first 2 hours of birth?: No  Breastfeeding Delayed Due to: Infant status    Maternal Assessment  Breast Assessment: Medium, Compressible  Nipple Assessment: Intact, Short  [place] Assessment: Normal    Breast Pump  Pump: Hospital grade electric pump, Hand expression (this is first time pumping)  Frequency: 8-10 times per day  Duration: 15-20 minutes per session  Breast Shield Size and Type: Other (comment) (18mm)    Other OB Lactation Tools  Lactation Tools: Lanolin, Flanges    Other OB Lactation Documentation  Maternal Risk Factors:  delivery  Infant Risk Factors: Prematurity <37 weeks, Low birth weight <2500 g    Recommendations/Summary  Met with mom at infant's bedside to introduce the availability of the [facility] lactation service. Provided education on the topics of: benefits of breastfeeding, [employer] breast pump kit and usage, pumping schedule, cleaning and sanitizing of pump equipment, storage of milk, and kangaroo care. Set up [employer] hospital pump for her first pumping session. Mom very receptive to teaching and states that she has done a lot of research prior to giving birth and has expressed drops prior to delivering. She plans to mainly pump and bottle feed the twins because she feels that it will be most convenient for her. Discussed the availability of the lefty medela symphony pumps. [facility] [employer] [facility] and mom will look out for its delivery. Invited to reach out to lactation consultant as questions/concerns arise.

## 2025-05-25 NOTE — ANESTHESIA POSTPROCEDURE EVALUATION
Patient: Suly Olson    Procedure Summary       Date: 25 Room / Location: [facility] OB 04 / Virtual [facility] 2 OB    Anesthesia Start: 1140 Anesthesia Stop: 1315    Procedure:  SECTION, MULTIPLE FETUSES, 2 FETUSES Diagnosis:       Monochorionic diamniotic twin gestation in second trimester ([facility])      Fetal growth restriction antepartum ([facility])      (Monochorionic diamniotic twin gestation in second trimester ([facility]) [O30.032])      (Fetal growth restriction antepartum ([facility]) [O36.5990])    Surgeons: Shanell Jarvis MD Responsible Provider: Deb Billy MD    Anesthesia Type: CSE ASA Status: 2            Anesthesia Type: CSE    Vitals Value Taken Time   BP  25 13:15   Temp 36.2 °C (97.2 °F) 25 13:15   Pulse 86 25 13:15   Resp 14 25 13:15   SpO2 97 25 13:15       Anesthesia Post Evaluation    Patient location during evaluation: bedside  Patient participation: complete - patient participated  Level of consciousness: awake and alert  Pain score: 0  Pain management: adequate  Airway patency: patent  Cardiovascular status: acceptable  Respiratory status: acceptable  Hydration status: acceptable  Postoperative Nausea and Vomiting: none        No notable events documented.

## 2025-05-25 NOTE — L&D DELIVERY NOTE
Birth Operative Report    Patient Name: Suly Olson  : 2005  MRN: [id number]  Age: 20 y.o.    /Para:   Gestational Age: 34w0d    Date of Surgery:      Ebonie Olson [[id number]]   2025      Whitney Cesia [[id number]]   2025    Operating Room Location: [facility]     Pre-op Diagnosis:  Intrauterine Pregnancy at 34w0d  Monochorionic diamniotic twins  Fetal growth restriction of twin A  Depression    Post-op Diagnosis:  Same    Procedure:   Primary Low Transverse  Section    Surgery Category at [facility] Time: Confirmed Scheduled, Non-urgent    Surgeon:    * Shanell Jarvis - Primary    Resident/Fellow/Other Assistant:   Surgeons and Role:     * Sanjana Trevino MD - Assisting     * Nilsa Posada MD - Assisting    Anesthesia:  Type: CSE     Anesthesiologist: Deb Billy MD  Anesthesia Resident: Jens Akhtar MD    Surgical Staff:  No surgical staff documented.     Ebonie Olson [[id number]]         Cesia Olson [[id number]]        Preoperative Antibiotics: Ancef 2 g and Azithromycin 500 mg    Indication for Procedure:   20 y.o.  at 34w0d who desired an elective primary  for mo-di twins.    Informed Consent:  The risks, benefits, complications, and alternatives were discussed with the patient. The patient understood that the risks of  section include but are not limited to infection, bleeding, injury to nearby structures or organs, possible need for transfusion, and potential need for more surgery. The patient stated understanding and desired to proceed. All questions were answered. The site of surgery was properly noted and marked. The patient's identity was confirmed, and the procedure verified as a  delivery. A Time Out was held and the above information confirmed.     Findings:   Normal appearing gravid uterus, fallopian tubes, and ovaries. Amniotic fluid      Ebonie Olson [[id number]]   Clear       [place], bTWOMayaBoy [[id number]]   Clear, Male infant in      Whitney, aONEMayaBoy [[id number]]   Vertex      [place], bTWOMayaBoy [[id number]]   Breech      Whitney, aONEMayaBoy [[id number]]   Occiput      [place], bTWOMayaBoy [[id number]]          Whitney, aONEMayaBoy [[id number]]   Anterior      Whitney, bTWOMayaBoy [[id number]]     presentation, APGARS      [place]lect[place], aONEMayaBoy [[id number]]   7      Whitney, bTWOMayaBoy [[id number]]   9 ,      [place], aONEMayaBoy [[id number]]   9      Whitney, bTWOMayaBoy [[id number]]   9 .  Birth Weight      Whitney, aONEMayaBoy [[id number]]   1.89 kg      Whitney, bTWOMayaBoy [[id number]]   1.69 kg.    Description of Procedure:   Patient was taken to the OR where regional anesthesia was found to be adequate.  She was then placed in the dorsal supine position with a left lateral tilt. A quinones catheter was placed, SCDs were applied, and a vaginal prep was performed. A pre-procedure time out was performed. The patient was given a prophylactic dose of IV antibiotics.  She was then prepped and draped in the usual sterile fashion.     A Pfannenstiel skin incision was made with the scalpel through the skin and subcutaneous fat to the underlying fascial layer. The fascia was incised in the midline with the scalpel and the incision was extended bilaterally. The superior aspect of the incision was grasped, tented up with Kocher clamps and the rectus muscle was dissected off. The muscles were divided in the midline, the peritoneum was then identified and entered bluntly and incision extended superiorly and inferiorly taking care to avoid underlying viscera.  The bladder blade was inserted.       Uterine Incision was made with the scalpel, the uterine cavity was entered, and the hysterotomy was extended cephalocaudally by stretching. Twin A was delivered atraumatically, the cord was clamped and cut and infant was handed off to awaiting nursing. The amniotic sac of twin B was then ruptured. Twin B was delivered  atraumatically, the cord was clamped and cut and infant was handed off to awaiting nursing. A cord segment and blood sample were collected for both cords. The placenta was then expressed. The uterus was exteriorized. The uterus was cleared of all clot and debris. The uterine incision was repaired using a running locked stitch of 0-Monocryl in one layer. Adequate hemostasis was noted. The uterus was placed back into the abdomen.    The hysterotomy was again evaluated and found to be hemostatic, the underside of the fascia and bladder and the rectus muscles were also found to be hemostatic. The fascia was closed using a running stitch of looped 0-PDS.  The subcutaneous layer was irrigated, small bleeders were cauterized. The subcutaneous layer was re-approximated using a running stitch of 2-0 Monocryl. The skin was closed with 4-0 Monocryl.         * Vane Nice was present for key portions of the procedure.    Complications:      Ebonie Olson [[id number]]         Cesia Olson [[id number]]             Anesthesia Record               Intraprocedure I/O Totals          Intake    LR bolus 1500.00 mL    Oxytocin Drip 0.00 mL    The total shown is the total volume documented since Anesthesia Start was filed.    Phenylephrine Drip 0.00 mL    The total shown is the total volume documented since Anesthesia Start was filed.    Total Intake 1500 mL       Output    Urine 140 mL    Total Blood Loss - Surgical Delivery (mL) 800 mL    Total Output 940 mL       Net    Net Volume 560 mL            Blood products:    Blood Product Administration History       None            Uterotonics/Hemostatic Agent: IV Pitocin 30 units    Specimen:      Ebonie Olson [[id number]]   Placenta  Delivered: 5/25/2025 12:33 PM  Appearance:    Removal:      Disposition:       Cesia Olson [[id number]]   Placenta  Delivered: 5/25/2025 12:33 PM  Appearance: Intact  Removal: Expressed    Disposition:  pathology    Sponge/Instrument/Needle Counts: The sponge, lap and needle counts were correct.    Patient Disposition: Patient recovering on labor and delivery in stable condition.        WhitneyEbonie [[id number]]      Labor Events    Sac identifier: Sac 1  Rupture date/time: 2024 1325  Rupture type: Artificial  Fluid color: Clear  Fluid odor: None  Labor type:  Without Labor  Labor allowed to proceed with plans for an attempted vaginal birth?: No  Complications: None       Labor Length    3rd stage: 0h 06m       Placenta    Placenta delivery date/time: 2025 12:33  Placenta removal:        Cord    Vessels: 3 vessels  Complications: None  Delayed cord clamping?: Yes  Cord clamped date/time: 2025 12:27:30  Cord blood disposition: Lab  Gases sent?: No  Cord comments: cord blood x2 sent to NICU with baby  Stem cell collection (by provider): No       Lacerations    Episiotomy: None  Perineal laceration: None  Other lacerations?: No  Repair suture: None       Anesthesia    Method: Combined spinal-epidural       Operative Delivery    Forceps attempted?: No  Vacuum extractor attempted?: No       Shoulder Dystocia    Shoulder dystocia present?: No        Delivery    Birth date/time: 2025 12:27:00  Delivery type: , Low Transverse   categorization: primary   priority: scheduled  Indications for : Other (Add Comments)  Complications: None       Resuscitation    Method: Suctioning, Tactile stimulation, Continuous positive airway pressure (CPAP)       Apgars    Living status: Living  Apgar Component Scores:  1 min.:  5 min.:  10 min.:  15 min.:  20 min.:    Skin color:  0  1       Heart rate:  2  2       Reflex irritability:  2  2       Muscle tone:  2  2       Respiratory effort:  1  2       Total:  7  9              Delivery Providers    Delivering clinician: Vane Rodriguez MD   Provider Role    Antonieta Maldonado RN Delivery Nurse    Renate Turner RN  Nursery Nurse    Sanjana Trevino MD Resident    Nilsa Posada MD Resident               Whitney, bTWOMayaBoaudie [[id number]]      Labor Events    Sac identifier: Sac 1  Rupture date/time: 2025 1227  Rupture type: Artificial  Fluid color: Clear  Labor type:  Without Labor  Labor allowed to proceed with plans for an attempted vaginal birth?: No  Complications: None       Labor Length    3rd stage: 0h 04m       Placenta    Placenta delivery date/time: 2025 12:33  Placenta removal: Expressed  Placenta appearance: Intact  Placenta disposition: pathology       Cord    Vessels: 3 vessels  Complications: None  Delayed cord clamping?: Yes  Cord clamped date/time: 2025 12:29:30  Cord blood disposition: Lab  Gases sent?: No  Cord comments: cord bloodx2 sent to NICU with baby  Stem cell collection (by provider): No       Lacerations    Episiotomy: None  Perineal laceration: None  Other lacerations?: No  Repair suture: None       Anesthesia    Method: Combined spinal-epidural       Operative Delivery    Forceps attempted?: No  Vacuum extractor attempted?: No       Shoulder Dystocia    Shoulder dystocia present?: No       [place] Delivery    Birth date/time: 2025 12:29:00  Delivery type: , Low Transverse   categorization: primary   priority: scheduled  Indications for : Other (Add Comments)  Complications: None       Resuscitation    Method: Suctioning, Tactile stimulation       Apgars    Living status: Living  Apgar Component Scores:  1 min.:  5 min.:  10 min.:  15 min.:  20 min.:    Skin color:  1  1       Heart rate:  2  2       Reflex irritability:  2  2       Muscle tone:  2  2       Respiratory effort:  2  2       Total:  9  9       Apgars assigned by: MD JOSE       Delivery Providers    Delivering clinician: Vane Rodriguez MD   Provider Role    Antonieta Maldonado RN Delivery Nurse    Diana Acosta, RN Nursery Nurse    Sanjana Trevino MD Resident    Nilsa ESCOBRA  MD Albino

## 2025-05-25 NOTE — ANESTHESIA PROCEDURE NOTES
Spinal Block    Patient location during procedure: OR  Start time: 5/25/2025 11:50 AM  End time: 5/25/2025 12:09 PM  Reason for block: primary anesthetic and at surgeon's request  Staffing  Performed: resident   Authorized by: Deb Billy MD    Performed by: Jens Akhtar MD    Preanesthetic Checklist  Completed: patient identified, IV checked, risks and benefits discussed, surgical consent, monitors and equipment checked, pre-op evaluation, timeout performed and sterile techniques followed  Block Timeout  RN/Licensed healthcare professional reads aloud to the Anesthesia provider and entire team: Patient identity, procedure with side and site, patient position, and as applicable the availability of implants/special equipment/special requirements.  Patient on coagulant treatment: no  Timeout performed at: 5/25/2025 11:45 AM  Spinal Block  Patient position: sitting  Prep: ChloraPrep  Sterility prep: cap, drape, gloves and mask  Sedation level: no sedation  Patient monitoring: blood pressure, continuous pulse oximetry and heart rate  Approach: midline  Vertebral space: L4-5  Injection technique: single-shot  Needle  Needle type: pencil-point   Needle gauge: 24 G  Free flowing CSF: yes    Assessment  Sensory level: other bilateral  Block outcome: patient comfortable  Additional Notes  Bone encountered on first attempt, redirected and went one level above with csf flow noted. Needle removed and next attempt at l4-l5, successful

## 2025-05-25 NOTE — PROGRESS NOTES
"ANTEPARTUM PROGRESS NOTE   2025, 6:11 AM     SUBJECTIVE: NAEON. Patient doing well this AM. Reports contractions have spaced and are now non-painful. Denies vb, lof, or dfm     OBJECTIVE:   /76   Pulse 84   Temp 36.5 °C (97.7 °F) (Temporal)   Resp 16   Ht 1.549 m (5' 0.98\")   Wt 80.6 kg (177 lb 11.1 oz)   SpO2 99%   BMI 33.59 kg/m²    Temp  Min: 36.5 °C (97.7 °F)  Max: 36.6 °C (97.9 °F)  Pulse  Min: 82  Max: 132  BP  Min: 109/56  Max: 125/56    General:  AAOx3, No acute distress  Cardiovascular: Warm and well perfused  Respiratory: Normal respiratory effort   Abdominal:  Soft, gravid,    NST:   Twin A   Baseline 115bpm/ mod variability/ + accels/ - decels  Twin B   Baseline 135bpm/ mod variability/ + accels/ - decels  [place]: occasional ctx     Labs:   Labs in chart were reviewed.    ASSESSMENT AND PLAN:     20 y.o.  at 34w0d by admitted for PTL.    PTL   -admitted from triage  with painful consistent ctx q1-5min.   -cervical change from 1 to 2cm at that time   -ctx have since spaced and are non-painful. Currently stalled at 2cm   -BMZ initiated on admission. S/p BMZ #2 @ 0030.  -Plan for delivery today     Mo-Di Twins   Fetal Status   -Last US:  Twin A EFW: 1686 g (9%), AC (17%), normal dopplers, Twin B EFW: 1721 g (12%), AC (35%) high normal dopplers   -CEFM, currently Cat I   -GBS neg  -BMZ as above  -NICU consult placed . Team aware    Routine:  - GBS neg   - TDAP 25  - Flu 25  - 1hr abnml, nml 3hr  - BCM: undecided (also currently in same sex relationship)    Dispo: Remain inpatient until delivery after BMZ complete. Sooner pending maternal/fetal status     Pt to be d/w Dr. Sung Aguayo MD  OBGYN, PGY-1        "

## 2025-05-26 LAB
BLOOD EXPIRATION DATE: NORMAL
DISPENSE STATUS: NORMAL
ERYTHROCYTE [DISTWIDTH] IN BLOOD BY AUTOMATED COUNT: 14.3 % (ref 11.5–14.5)
ERYTHROCYTE [DISTWIDTH] IN BLOOD BY AUTOMATED COUNT: 14.5 % (ref 11.5–14.5)
ERYTHROCYTE [DISTWIDTH] IN BLOOD BY AUTOMATED COUNT: 14.9 % (ref 11.5–14.5)
HCT VFR BLD AUTO: 22.5 % (ref 36–46)
HCT VFR BLD AUTO: 23 % (ref 36–46)
HCT VFR BLD AUTO: 26.3 % (ref 36–46)
HGB BLD-MCNC: 6.9 G/DL (ref 12–16)
HGB BLD-MCNC: 7 G/DL (ref 12–16)
HGB BLD-MCNC: 8.2 G/DL (ref 12–16)
MCH RBC QN AUTO: 24 PG (ref 26–34)
MCH RBC QN AUTO: 24.2 PG (ref 26–34)
MCH RBC QN AUTO: 24.6 PG (ref 26–34)
MCHC RBC AUTO-ENTMCNC: 30.4 G/DL (ref 32–36)
MCHC RBC AUTO-ENTMCNC: 30.7 G/DL (ref 32–36)
MCHC RBC AUTO-ENTMCNC: 31.2 G/DL (ref 32–36)
MCV RBC AUTO: 79 FL (ref 80–100)
NRBC BLD-RTO: 1.4 /100 WBCS (ref 0–0)
NRBC BLD-RTO: 1.5 /100 WBCS (ref 0–0)
NRBC BLD-RTO: 2.3 /100 WBCS (ref 0–0)
PLATELET # BLD AUTO: 155 X10*3/UL (ref 150–450)
PLATELET # BLD AUTO: 155 X10*3/UL (ref 150–450)
PLATELET # BLD AUTO: 166 X10*3/UL (ref 150–450)
PRODUCT BLOOD TYPE: 6200
PRODUCT CODE: NORMAL
RBC # BLD AUTO: 2.85 X10*6/UL (ref 4–5.2)
RBC # BLD AUTO: 2.92 X10*6/UL (ref 4–5.2)
RBC # BLD AUTO: 3.33 X10*6/UL (ref 4–5.2)
UNIT ABO: NORMAL
UNIT NUMBER: NORMAL
UNIT RH: NORMAL
UNIT VOLUME: 350
WBC # BLD AUTO: 7.5 X10*3/UL (ref 4.4–11.3)
WBC # BLD AUTO: 7.9 X10*3/UL (ref 4.4–11.3)
WBC # BLD AUTO: 8.4 X10*3/UL (ref 4.4–11.3)
XM INTEP: NORMAL

## 2025-05-26 PROCEDURE — 85027 COMPLETE CBC AUTOMATED: CPT

## 2025-05-26 PROCEDURE — 1210000001 HC SEMI-PRIVATE ROOM DAILY

## 2025-05-26 PROCEDURE — 36415 COLL VENOUS BLD VENIPUNCTURE: CPT

## 2025-05-26 PROCEDURE — 2500000005 HC RX 250 GENERAL PHARMACY W/O HCPCS: Mod: SE

## 2025-05-26 PROCEDURE — 2500000001 HC RX 250 WO HCPCS SELF ADMINISTERED DRUGS (ALT 637 FOR MEDICARE OP): Mod: SE

## 2025-05-26 PROCEDURE — 2500000004 HC RX 250 GENERAL PHARMACY W/ HCPCS (ALT 636 FOR OP/ED): Mod: JZ,SE

## 2025-05-26 PROCEDURE — 36430 TRANSFUSION BLD/BLD COMPNT: CPT

## 2025-05-26 PROCEDURE — P9016 RBC LEUKOCYTES REDUCED: HCPCS

## 2025-05-26 PROCEDURE — 99232 SBSQ HOSP IP/OBS MODERATE 35: CPT

## 2025-05-26 RX ORDER — CYCLOBENZAPRINE HCL 10 MG
10 TABLET ORAL 3 TIMES DAILY PRN
Status: DISCONTINUED | OUTPATIENT
Start: 2025-05-26 | End: 2025-05-27 | Stop reason: SDUPTHER

## 2025-05-26 RX ORDER — METOCLOPRAMIDE 10 MG/1
10 TABLET ORAL ONCE
Status: COMPLETED | OUTPATIENT
Start: 2025-05-26 | End: 2025-05-26

## 2025-05-26 RX ADMIN — KETOROLAC TROMETHAMINE 30 MG: 30 INJECTION, SOLUTION INTRAMUSCULAR; INTRAVENOUS at 08:17

## 2025-05-26 RX ADMIN — ACETAMINOPHEN 975 MG: 325 TABLET ORAL at 01:51

## 2025-05-26 RX ADMIN — ENOXAPARIN SODIUM 40 MG: 100 INJECTION SUBCUTANEOUS at 01:52

## 2025-05-26 RX ADMIN — IBUPROFEN 600 MG: 600 TABLET ORAL at 14:23

## 2025-05-26 RX ADMIN — DIPHENHYDRAMINE HYDROCHLORIDE 25 MG: 25 CAPSULE ORAL at 01:51

## 2025-05-26 RX ADMIN — CYCLOBENZAPRINE 10 MG: 10 TABLET, FILM COATED ORAL at 19:07

## 2025-05-26 RX ADMIN — IBUPROFEN 600 MG: 600 TABLET ORAL at 20:03

## 2025-05-26 RX ADMIN — ACETAMINOPHEN 975 MG: 325 TABLET ORAL at 20:03

## 2025-05-26 RX ADMIN — KETOROLAC TROMETHAMINE 30 MG: 30 INJECTION, SOLUTION INTRAMUSCULAR; INTRAVENOUS at 01:52

## 2025-05-26 RX ADMIN — ACETAMINOPHEN 975 MG: 325 TABLET ORAL at 14:23

## 2025-05-26 RX ADMIN — METOCLOPRAMIDE 10 MG: 10 TABLET ORAL at 14:23

## 2025-05-26 RX ADMIN — LIDOCAINE 4% 1 PATCH: 40 PATCH TOPICAL at 22:42

## 2025-05-26 RX ADMIN — ACETAMINOPHEN 975 MG: 325 TABLET ORAL at 08:17

## 2025-05-26 ASSESSMENT — PAIN DESCRIPTION - DESCRIPTORS
DESCRIPTORS: HEADACHE
DESCRIPTORS: ACHING
DESCRIPTORS: SORE

## 2025-05-26 ASSESSMENT — PAIN SCALES - GENERAL
PAINLEVEL_OUTOF10: 1
PAINLEVEL_OUTOF10: 4
PAINLEVEL_OUTOF10: 0 - NO PAIN
PAINLEVEL_OUTOF10: 4
PAINLEVEL_OUTOF10: 5 - MODERATE PAIN
PAINLEVEL_OUTOF10: 5 - MODERATE PAIN
PAINLEVEL_OUTOF10: 2
PAINLEVEL_OUTOF10: 0 - NO PAIN

## 2025-05-26 NOTE — ANESTHESIA PROCEDURE NOTES
CSE Block    Patient location during procedure: OR  Start time: 5/24/2025 11:50 AM  End time: 5/24/2025 12:09 PM  Reason for block: primary anesthetic, at surgeon's request and post-op pain management}  Staffing  Performed: resident   Authorized by: Deb Billy MD    Performed by: Jens Akhtar MD    Preanesthetic Checklist  Completed: patient identified, IV checked, risks and benefits discussed, surgical consent, monitors and equipment checked, pre-op evaluation, timeout performed and sterile techniques followed  Block Timeout  RN/Licensed healthcare professional reads aloud to the Anesthesia provider and entire team: Patient identity, procedure with side and site, patient position, and as applicable the availability of implants/special equipment/special requirements.  Patient on coagulant treatment: no  Timeout performed at: 5/24/2025 11:45 AM    CSE Block  Patient position: sitting  Prep: ChloraPrep  Sterility prep: cap, gown, mask, drape and gloves  Sedation level: no sedation  Patient monitoring: continuous pulse oximetry and blood pressure  Approach: midline  Local numbing: lidocaine 1% to skin and subcutaneous tissues  Vertebral space: lumbar  L3-4  Guidance: landmark technique    Epidural Needle  RUT technique: saline  Needle type: Tuohy   Needle gauge: 17 G  Needle length: 8.9 cm  Spinal Needle  Needle type: pencil-point   Needle gauge: 25 G  Needle length: 12.7 cm  Free flow CSF: yes  Epidural Catheter  Catheter type: multi-orifice  Catheter size: 20 G  Catheter securement method: clear occlusive dressing              Assessment bilateral  Block outcome: Allis test negative  Number of attempts: 2  Events: wet tap/possible wet tap  Procedure assessment: patient tolerated procedure well with no immediate complications  Additional Notes  Bone encountered on first attempt, redirected and went one level above with csf flow noted. Needle removed and next attempt at l4-l5, successful

## 2025-05-26 NOTE — PROGRESS NOTES
Postpartum Progress Note    Assessment/Plan   Suly Olsno is a 20 y.o., , who delivered at 34w0d gestation    Now PPD#1 s/p      Whitney, aONEMayaBoy [[id number]]   , Low Transverse      [place], bTWOMayaBoy [[id number]]   , Low Transverse on      [place], aONEMayaBoy [[id number]]   2025      [place], bTWOMayaBoy [[id number]]   2025  - Continue routine postpartum care  - Pain well controlled on po medications  - DVT risk score DVT Score (IF A SCORE IS NOT CALCULATING, MUST SELECT A BMI TO COMPLETE): 8 , ppx with SCDs, ambulation, and lovenox  - RH positive, rhogam not indicated  - Hgb:   Results from last 7 days   Lab Units 25  0538 25  0029 25  1259   HEMOGLOBIN g/dL 6.9* 10.7* 9.8*        Acute Blood Loss Anemia  - EBL 800cc  - Hgb trend as above  - Discordant drop in hgb, CBC pending  - Patient agreeable to blood transfusion, 1u PRBC ordered with 6hr post transfusion CBC  - Vitals stable and WNL  - Fatigue/headache, otherwise asymptomatic    Headache  - BP normotensive  - Drinking coffee, increasing PO hydration  - Tylenol/ibuprofen scheduled, will add reglan if unresolved    Depression  - No meds  - Mood stable  - Declines needs for additional resources at this time     Mild Range BP  - <4hrs apart, no diagnosis of HTN  - Reviewed signs elevated of BP  - Normotensive during postpartum course     Maternal Well-Being  - Vitals stable  - All questions and concerns address    [place] Feeding  - Breastfeeding/pumping encouraged  - Lactation consult prn    Contraception  - Declines  - Education provided    Dispo  - Anticipate d/c on PPD #3 if meeting all postpartum milestones  - Follow-up in 1-2wks for incision check  - Follow-up in 4-6wks with primary NIDIA Juan-CNP     Assessment & Plan  Indication for care or intervention in labor or delivery ([facility])    33 weeks gestation of pregnancy ([facility])    Monochorionic diamniotic twin gestation in second  trimester ([facility])    Fetal growth restriction antepartum ([facility])    Pregnancy Problems (from 25 to present)       Problem Noted Diagnosed Resolved    Indication for care or intervention in labor or delivery ([facility]) 2025 by Do Mahmood MD  No    Priority:  Medium       Fetal growth restriction antepartum ([facility]) 2025 by Camille Neumann MD  No    Priority:  Medium       Overview Addendum 2025  4:00 PM by Camille Neumann MD   25: FGR of Twin A with normal dopplers. Normal growth of Twin B, though elevated dopplers   [x] twice weekly  testing   [] delivery at 34 weeks         33 weeks gestation of pregnancy ([facility]) 2025 by Camille Neumann MD  No    Priority:  Medium       Overview Addendum 2025  3:53 PM by Camille Neumann MD   Desired provider in labor: [] CNM  [x] Physician  [x] Initial BMI: cannot be calculated   [x] Prenatal Labs: WNL, in media tab  [x] Rh status: positive  [x] Genetic Screening:  risk reducing Tab cfDNA  [x] Pregnancy dated by: first trimester US, report/images not available     [x] Anatomy US: incomplete on   [x] 1hr GCT at 24-28wks: elevated 1 hr, normal 3 hr  [x] Fetal Surveillance (if indicated): weekly at 32 wks  [x] Tdap (27-36wks): given      [x] Breastfeeding: breast pump Rx given  [x] Postpartum Birth control method: currently in same sex relationship & undecided on birth control  [x] GBS at 36 - 37 wks: collected   [x] 39 weeks discussion of IOL vs. Expectant management: 34 weeks  [x] Mode of delivery ( anticipated ): desires primary             Monochorionic diamniotic twin gestation in second trimester ([facility]) 2025 by Camille Neumann MD  No    Priority:  Medium       Overview Addendum 2025  3:53 PM by Camille Neumann MD   [x] fetal echos - LOC 0 x2  [x] genetics - rr cfDNA  [x] q2 week TTTS screening starting at 16 weeks   [] delivery 34-36wks              Depression affecting pregnancy in second trimester,  antepartum ([facility]) 2025 by Camille Neumann MD  No    Priority:  Medium               Subjective     Suly Olson is PPD#1 s/p  who reports feeling overall well.  No acute events overnight.  Voiding spontaneously, passing flatus.  Pain well controlled on PO meds.  Light lochia. Tolerating diet.  Denies CP, SOB, RUQ pain, vision changes or N/V. Denies dizziness, lightheadedness, LOC, or uncontrolled bleeding.  Reports feeling fatigued.    Having a headache.  4/10.  Has not ate or drank much at all today.  Also did not have normal cup of coffee in the morning.    Objective   Allergies:   Patient has no known allergies.         Last Vitals:  Temp Pulse Resp BP MAP Pulse Ox   36.7 °C (98.1 °F) 94 18 115/70   99 %     Vitals Min/Max Last 24 Hours:  Temp  Min: 36.2 °C (97.2 °F)  Max: 37.2 °C (99 °F)  Pulse  Min: 73  Max: 106  Resp  Min: 17  Max: 20  BP  Min: 114/64  Max: 139/91    Intake/Output:     Intake/Output Summary (Last 24 hours) at 2025 1251  Last data filed at 2025 0400  Gross per 24 hour   Intake --   Output 1200 ml   Net -1200 ml       Physical Exam:  General: examination reveals a well developed, well nourished, female, in no acute distress. She is alert and cooperative.  HEENT: external ears normal. Nose normal, no erythema or discharge.  Neck: supple, no significant adenopathy  Lungs: breathing even and unlabored, lungs clear  Cardiac: warm and well perfused, heart rate regular  Fundus: firm and below umbilicus, lochia light  Abdominal: soft, non-tender, non-distended, bowel sounds active  Extremities: no redness or tenderness in the calves or thighs, edema: non-pitting BLE  Neurological: alert, oriented, normal speech, no focal findings or movement disorder noted.  Psychological: awake and alert; oriented to person, place, and time.  Skin: initial post-op dressing clean, dry, and intact    Lab Data:  Labs in chart were reviewed.   DISCHARGE

## 2025-05-26 NOTE — LACTATION NOTE
Lactation Consultant Note  Recommendations/Summary  Attempted to see mom in regards to pumping for her baby in the NICU, however, she was asleep.    Previously (Yesterday evening) NICU  lactation ordered a breast pump for her and spoke to patient.

## 2025-05-26 NOTE — ANESTHESIA POSTPROCEDURE EVALUATION
Patient: Suly Olson    Procedure Summary       Date: 25 Room / Location: [facility] 04 /  [facility] OB    Anesthesia Start: 1140 Anesthesia Stop: 1315    Procedure:  SECTION, MULTIPLE FETUSES, 2 FETUSES Diagnosis:       Monochorionic diamniotic twin gestation in second trimester ([facility])      Fetal growth restriction antepartum ([facility])      (Monochorionic diamniotic twin gestation in second trimester ([facility]) [O30.032])      (Fetal growth restriction antepartum ([facility]) [O36.5990])    Surgeons: Shanell Jarvis MD Responsible Provider: Deb Billy MD    Anesthesia Type: CSE ASA Status: 2          Suly Olson is a 20 y.o., , who had a      [place], aONEMayaBoy [[id number]]   , Low Transverse      Whitney, bTWOMayaBoy [[id number]]   , Low Transverse delivery on      Whitney, aONEMayaBoy [[id number]]   2025      Whitney, bTWOMayaBoy [[id number]]   2025 at 34w0d and is now POD1.    She had Neuraxial Anesthesia without immediate complications noted.       Pain well controlled    Pt had wet tap during CSE for C/s. Pt seen this morning, endorsing no HA, nausea, vomiting, back pain or BLE weakness. Pt informed of possible HA given wet tap and told to call anesthesia if any positional HA or other symptoms develop.     Vitals:    25 0419   BP: 119/75   Pulse: 78   Resp: 18   Temp: 36.2 °C (97.2 °F)   SpO2: 98%       Neuraxial site assessed. No visible redness or swelling or drainage. Patient able to ambulate and move all extremities without difficulty. Able to void. No complaints of nausea/vomiting. Tolerating PO intake well. No s/sx of PDPH.     Anesthesia will sign off     Matilda Vides MD

## 2025-05-27 LAB
HOLD SPECIMEN: NORMAL
HOLD SPECIMEN: NORMAL

## 2025-05-27 PROCEDURE — 2500000001 HC RX 250 WO HCPCS SELF ADMINISTERED DRUGS (ALT 637 FOR MEDICARE OP): Mod: SE

## 2025-05-27 PROCEDURE — 1210000001 HC SEMI-PRIVATE ROOM DAILY

## 2025-05-27 PROCEDURE — 2500000004 HC RX 250 GENERAL PHARMACY W/ HCPCS (ALT 636 FOR OP/ED): Mod: JZ,SE

## 2025-05-27 RX ORDER — CYCLOBENZAPRINE HCL 10 MG
10 TABLET ORAL 3 TIMES DAILY
Status: DISCONTINUED | OUTPATIENT
Start: 2025-05-27 | End: 2025-05-30 | Stop reason: HOSPADM

## 2025-05-27 RX ORDER — SUMATRIPTAN SUCCINATE 50 MG/1
50 TABLET ORAL EVERY 2 HOUR PRN
Status: CANCELLED | OUTPATIENT
Start: 2025-05-27

## 2025-05-27 RX ORDER — SUMATRIPTAN SUCCINATE 50 MG/1
50 TABLET ORAL EVERY 2 HOUR PRN
Status: DISCONTINUED | OUTPATIENT
Start: 2025-05-27 | End: 2025-05-30 | Stop reason: HOSPADM

## 2025-05-27 RX ORDER — CAFFEINE 200 MG
200 TABLET ORAL ONCE
Status: COMPLETED | OUTPATIENT
Start: 2025-05-27 | End: 2025-05-27

## 2025-05-27 RX ORDER — DIPHENHYDRAMINE HCL 25 MG
25 CAPSULE ORAL EVERY 6 HOURS PRN
Status: DISCONTINUED | OUTPATIENT
Start: 2025-05-27 | End: 2025-05-30 | Stop reason: HOSPADM

## 2025-05-27 RX ORDER — METOCLOPRAMIDE 10 MG/1
10 TABLET ORAL ONCE
Status: COMPLETED | OUTPATIENT
Start: 2025-05-27 | End: 2025-05-27

## 2025-05-27 RX ADMIN — ACETAMINOPHEN 975 MG: 325 TABLET ORAL at 21:55

## 2025-05-27 RX ADMIN — CAFFEINE 200 MG: 200 TABLET ORAL at 21:55

## 2025-05-27 RX ADMIN — CYCLOBENZAPRINE 10 MG: 10 TABLET, FILM COATED ORAL at 13:26

## 2025-05-27 RX ADMIN — SUMATRIPTAN SUCCINATE 50 MG: 50 TABLET ORAL at 18:59

## 2025-05-27 RX ADMIN — ENOXAPARIN SODIUM 40 MG: 100 INJECTION SUBCUTANEOUS at 02:28

## 2025-05-27 RX ADMIN — ACETAMINOPHEN 975 MG: 325 TABLET ORAL at 02:28

## 2025-05-27 RX ADMIN — IRON SUCROSE 200 MG: 20 INJECTION, SOLUTION INTRAVENOUS at 21:55

## 2025-05-27 RX ADMIN — CYCLOBENZAPRINE 10 MG: 10 TABLET, FILM COATED ORAL at 21:56

## 2025-05-27 RX ADMIN — IBUPROFEN 600 MG: 600 TABLET ORAL at 02:28

## 2025-05-27 RX ADMIN — ACETAMINOPHEN 975 MG: 325 TABLET ORAL at 14:07

## 2025-05-27 RX ADMIN — ACETAMINOPHEN 975 MG: 325 TABLET ORAL at 08:20

## 2025-05-27 RX ADMIN — IBUPROFEN 600 MG: 600 TABLET ORAL at 14:07

## 2025-05-27 RX ADMIN — OXYCODONE 5 MG: 5 TABLET ORAL at 23:14

## 2025-05-27 RX ADMIN — METOCLOPRAMIDE 10 MG: 10 TABLET ORAL at 13:26

## 2025-05-27 RX ADMIN — IBUPROFEN 600 MG: 600 TABLET ORAL at 21:56

## 2025-05-27 RX ADMIN — DIPHENHYDRAMINE HYDROCHLORIDE 25 MG: 25 CAPSULE ORAL at 13:26

## 2025-05-27 RX ADMIN — IBUPROFEN 600 MG: 600 TABLET ORAL at 08:19

## 2025-05-27 ASSESSMENT — PAIN DESCRIPTION - DESCRIPTORS
DESCRIPTORS: HEADACHE
DESCRIPTORS: HEADACHE
DESCRIPTORS: DULL

## 2025-05-27 ASSESSMENT — PAIN SCALES - GENERAL
PAINLEVEL_OUTOF10: 3
PAINLEVEL_OUTOF10: 6
PAINLEVEL_OUTOF10: 4

## 2025-05-27 NOTE — LACTATION NOTE
Lactation Consultant Note  Lactation Consultation       Maternal Information       Maternal Assessment       Infant Assessment       Feeding Assessment       LATCH TOOL       Breast Pump       Other OB Lactation Tools       Patient Follow-up       Other OB Lactation Documentation       Recommendations/Summary  Mother reported pumping has been going well and denied any questions or concerns. Mother has been producing more colostrum with her pumping sessions, she thinks about 20 ml but is unsure exact amounts. I educated mother on the maintain settings of the symphony pump and recommended she begin using this if she has consistently been producing 20ml with her last 3 pumping sessions. I provided mother with outpatient lactation services available to her. Encouraged her to call for any questions or assistance needed throughout hospital stay.

## 2025-05-27 NOTE — PROGRESS NOTES
Postpartum Progress Note    Assessment/Plan   Suly Olson is a 20 y.o., , who was admitted on 2025 for pre-term labor, delivered Mo-Di Twins at 34w0d gestation via pLTCS. Her pregnancy was complicated by Depression, Anemia, and Abnormal 1hr.    Routine Post-Operative Care  Now PPD#2 s/p pLTCS  -Meeting postpartum milestones  -Discussed importance of movement and bowel regimen  -Continue routine postpartum care  -Pain well controlled on ERAS protocal   -DVT Score (IF A SCORE IS NOT CALCULATING, MUST SELECT A BMI TO COMPLETE): 8 - encourage ambulation,  SCDs, and  ppx lovenox  -A POS, Rhogam not indicated  - Hgb:   Results from last 7 days   Lab Units 25  2242 25  1240 25  0538   HEMOGLOBIN g/dL 8.2* 7.0* 6.9*      Acute Blood Loss Anemia  - EBL 800cc  - Hgb trend as above  - s/p 1 unit PRBC's, post transfusion Hgb 8.2  - Vitals stable and WNL  - Fatigue/headache, otherwise asymptomatic  - Agreeable to IV iron infusions, reviewed benefits     Headache in s/o wet tap during CSE, like spinal HA  - BP normotensive  - Minimal relieve with oral medications, and conservative therapy  - Positional, worsens with standing, resolved when lying flat  - Evaluated by anesthesia today who offered blood patch patient unsure  - Continue conservative management, pending blood patch  -Symptoms most consistent with spinal HA given know wet tap     Depression  - No meds  - Mood stable has great support system  - Discussed increased risk for PPD, agreeable to OB behavioral health referral on discharge     Elevated BP, no dx gHTN/PEC  - mild range BP   - asymptomatic   - Largely normotensive  - Reviewed s/sx of PEC  - continue to monitor, if further mild range BP will meet criteria for gHTN     Maternal Well-Being  -Feeling emotional hasn't been able to bond with babies like she wants due to Headache  -Reports transportation difficulties, agreeable to SSW referral and rooming in NICU with infants  -Emotional  support provided, reviewed baby blues and warning signs, postpartum course anticipatory guidance provided, reviewed normal expectations and maternal warning signs  -Provided family centered care, and addressed all questions and concerns     Feeding  -The patient is breast feeding, milk supply well established  -Counseled patient on importance of on demand feeding or pumping atleast 8-10 times daily to promote adequate supply.  -Breastfeeding/pumping encouraged, lactation consult ordered, PRN    Contraception  -Same sex relationship  -Declines at this time    Dispo  - Anticipate d/c on PPD #3 if meeting all postpartum milestones, and pending headache resolution, treatment course  - Follow-up in 1-2wks for incision check  - Follow-up in 4-6wks with primary SOHAM Lovett, APRN-CNP, CLC   25 7:27 AM  vocera    Assessment & Plan  Indication for care or intervention in labor or delivery ([facility])    33 weeks gestation of pregnancy ([facility])    Monochorionic diamniotic twin gestation in second trimester ([facility])    Fetal growth restriction antepartum ([facility])    Pregnancy Problems (from 25 to present)       Problem Noted Diagnosed Resolved    Indication for care or intervention in labor or delivery ([facility]) 2025 by Do Mahmood MD  No    Priority:  Medium       Fetal growth restriction antepartum ([facility]) 2025 by Camille Neumann MD  No    Priority:  Medium       Overview Addendum 2025  4:00 PM by Camille Neumann MD   25: FGR of Twin A with normal dopplers. Normal growth of Twin B, though elevated dopplers   [x] twice weekly  testing   [] delivery at 34 weeks         33 weeks gestation of pregnancy ([facility]) 2025 by Camille Neumann MD  No    Priority:  Medium       Overview Addendum 2025  3:53 PM by Camille Neumann MD   Desired provider in labor: [] CNM  [x] Physician  [x] Initial BMI: cannot be calculated   [x] Prenatal Labs: WNL, in media tab  [x] Rh status:  positive  [x] Genetic Screening:  risk reducing Tab cfDNA  [x] Pregnancy dated by: first trimester US, report/images not available     [x] Anatomy US: incomplete on   [x] 1hr GCT at 24-28wks: elevated 1 hr, normal 3 hr  [x] Fetal Surveillance (if indicated): weekly at 32 wks  [x] Tdap (27-36wks): given      [x] Breastfeeding: breast pump Rx given  [x] Postpartum Birth control method: currently in same sex relationship & undecided on birth control  [x] GBS at 36 - 37 wks: collected   [x] 39 weeks discussion of IOL vs. Expectant management: 34 weeks  [x] Mode of delivery ( anticipated ): desires primary             Monochorionic diamniotic twin gestation in second trimester ([facility]) 2025 by Camille Neumann MD  No    Priority:  Medium       Overview Addendum 2025  3:53 PM by Camille Neumann MD   [x] fetal echos - LOC 0 x2  [x] genetics - rr cfDNA  [x] q2 week TTTS screening starting at 16 weeks   [] delivery 34-36wks              Depression affecting pregnancy in second trimester, antepartum ([facility]) 2025 by Camille Neumann MD  No    Priority:  Medium               Subjective   The patient's pain is well controlled with current medication regimen. She denies chest pain, cough, shortness of breath, headaches, vision changes, pain under right breast, fever, heavy vaginal bleeding, abdominal pain, dizziness, fatigue, or chills. She denies any breast concerns or emotional concerns at this time.     Suly Whitney is PPD#2 s/p  who reports feeling overall well.  She denies any concerns today. No acute events overnight. She is ambulating and urinating without difficulty. She is tolerating an adult regular diet, passing flatus, and has not had a bowel movement yet.      Objective   Allergies:   Patient has no known allergies.         Last Vitals:  Temp Pulse Resp BP MAP Pulse Ox   36.3 °C (97.3 °F) 74 18 125/80   98 %     Vitals Min/Max Last 24 Hours:  Temp  Min: 36.3 °C (97.3 °F)  Max: 37  °C (98.6 °F)  Pulse  Min: 73  Max: 95  Resp  Min: 18  Max: 20  BP  Min: 96/58  Max: 128/80    Physical Exam:  Constitutional: examination reveals a well developed, well nourished, female, in no acute distress. She is alert, pleasant and cooperative.  Cardiac: warm and well perfused, regular rate, S1, S2 normal, no murmur, click, rub or gallop  Respiratory:  Even and unlabored breathing in room air, clear to auscultation bilaterally. No crackles or wheezes.  Fundus: Firm and below umbilicus  Breast: No masses, or nipple discharge   Derm: Skin color, texture, turgor normal. No rashes, or lesions.    Abdominal: soft, non-tender, non-distended, active bowel sounds x4, no masses, no organomegaly  : Fundus firm, midline at umbilicus, lochia light   Extremities: Symmetrical, no redness or tenderness in the calves or thighs, no edema or discoloration  Neurological: PERRLA. alert, oriented, normal speech, no focal findings or movement disorder noted.  Psychological: Appropriate mood and affect. Awake and alert; oriented to person, place, and time.   Skin: incision clean, dry, intact, well approximated, no redness, drainage, or warmth     Lab Data:  Labs in chart were reviewed.  Lab Results   Component Value Date    WBC 7.5 05/26/2025    HGB 8.2 (L) 05/26/2025    HCT 26.3 (L) 05/26/2025     05/26/2025

## 2025-05-27 NOTE — LACTATION NOTE
Lactation Consultant Note  Lactation Consultation       Maternal Information       Maternal Assessment       Infant Assessment       Feeding Assessment       LATCH TOOL       Breast Pump       Other OB Lactation Tools       Patient Follow-up       Other OB Lactation Documentation       Recommendations/Summary  LC attempted to see mom but she was asleep. Will try again later.

## 2025-05-27 NOTE — CARE PLAN
Problem: Pain - Adult  Goal: Verbalizes/displays adequate comfort level or baseline comfort level  Outcome: Progressing     Problem: Safety - Adult  Goal: Free from fall injury  Outcome: Progressing     Problem: Postpartum  Goal: Experiences normal postpartum course  Outcome: Progressing  Goal: No s/sx infection  Outcome: Progressing     Problem: Discharge Planning  Goal: Discharge to home or other facility with appropriate resources  Outcome: Progressing   The patient's goals for the shift include pain control    The clinical goals for the shift include Patient will have reilef of headache  Patient meeting all PP milestones. Patient has had no relief with HA today. Will try blood patch tomorrow if not any better.

## 2025-05-27 NOTE — PROGRESS NOTES
Social Work Assessment       Patient: Suly Olson  Partner: Mandi  Address: [address] [place] [address] [address] [place], [place] [id number]  Phone: [phone]    Referral Reason: Risk Screen. NICU Admission    Prenatal Care: [facility]- 1st visit at 16 weeks    [place] Name: Francisco  Pacolet : 25    Other Children: Mother reports these are her first children    Household Composition: Mother states she and her girlfriend reside together    IPV/DV or Safety Concerns: Denies    Car-Seat: Yes- 2 car seats  Safe Sleep Space: Yes- 2 pack n plays  Safe Sleep Education: SHRAVAN discussed the ABC's of safe sleep    Transportation Concerns: Yes- Mother states having no transportation to and from hospital once she is discharged. Mother states family members assist with transportation to and from appointments.     SHRAVAN discussed [facility] as an option once mother is discharged during hospital admission for twins.     School/Work/Income: Mother states she works at a .     Mother states she is connected with [employer] and receives food stamps. She reports she has applied for cash assistance as well when she is out of work.     Mother also reports being homeless last year. She reports she has housing right now but was recently approved for [facility] housing.     Insurance: Medicaid- Mother reports she recently got insurance. SHRAVAN emailed [employer] to assist with getting baby added to case.     Mother reports being diagnosed with anxiety and depression. She reports she had counseling services as a minor but nothing currently. Mother denied any concerns with mood. TANRK discussed baby blues and postpartum depression. SHRAVAN discussed available MH services.     Supports: Mother states her family and girlfriends side of the family     Substance Use History: Mother reports she stopped using marijuana when she found out she was pregnant.     Toxicology Screens: No screens completed    Department of Children and Family Services (DCFS): N/A    SHRAVAN  discussed visitor policy.       Plan: There are no psychosocial concerns; SWRK to remain available to follow as needed.     YEVGENIY Deleon  Ext [phone] [facility]

## 2025-05-28 ENCOUNTER — APPOINTMENT (OUTPATIENT)
Dept: MATERNAL FETAL MEDICINE | Facility: CLINIC | Age: 20
End: 2025-05-28
Payer: MEDICAID

## 2025-05-28 ENCOUNTER — ANESTHESIA (OUTPATIENT)
Dept: OBSTETRICS AND GYNECOLOGY | Facility: HOSPITAL | Age: 20
End: 2025-05-28
Payer: MEDICAID

## 2025-05-28 ENCOUNTER — ANESTHESIA EVENT (OUTPATIENT)
Dept: OBSTETRICS AND GYNECOLOGY | Facility: HOSPITAL | Age: 20
End: 2025-05-28
Payer: MEDICAID

## 2025-05-28 PROBLEM — D64.9 ANEMIA: Status: ACTIVE | Noted: 2025-05-28

## 2025-05-28 PROBLEM — O13.9 GESTATIONAL HYPERTENSION, ANTEPARTUM (HHS-HCC): Status: ACTIVE | Noted: 2025-05-28

## 2025-05-28 LAB
ALBUMIN SERPL BCP-MCNC: 3.4 G/DL (ref 3.4–5)
ALP SERPL-CCNC: 218 U/L (ref 33–110)
ALT SERPL W P-5'-P-CCNC: 6 U/L (ref 7–45)
ANION GAP SERPL CALC-SCNC: 16 MMOL/L (ref 10–20)
AST SERPL W P-5'-P-CCNC: 15 U/L (ref 9–39)
BILIRUB SERPL-MCNC: 0.3 MG/DL (ref 0–1.2)
BUN SERPL-MCNC: 10 MG/DL (ref 6–23)
CALCIUM SERPL-MCNC: 9.3 MG/DL (ref 8.6–10.6)
CHLORIDE SERPL-SCNC: 105 MMOL/L (ref 98–107)
CO2 SERPL-SCNC: 22 MMOL/L (ref 21–32)
CREAT SERPL-MCNC: 0.58 MG/DL (ref 0.5–1.05)
EGFRCR SERPLBLD CKD-EPI 2021: >90 ML/MIN/1.73M*2
ERYTHROCYTE [DISTWIDTH] IN BLOOD BY AUTOMATED COUNT: 15.2 % (ref 11.5–14.5)
GLUCOSE SERPL-MCNC: 76 MG/DL (ref 74–99)
HCT VFR BLD AUTO: 33.8 % (ref 36–46)
HGB BLD-MCNC: 10.6 G/DL (ref 12–16)
MCH RBC QN AUTO: 24.3 PG (ref 26–34)
MCHC RBC AUTO-ENTMCNC: 31.4 G/DL (ref 32–36)
MCV RBC AUTO: 77 FL (ref 80–100)
NRBC BLD-RTO: 2.3 /100 WBCS (ref 0–0)
PLATELET # BLD AUTO: 201 X10*3/UL (ref 150–450)
POTASSIUM SERPL-SCNC: 3.8 MMOL/L (ref 3.5–5.3)
PROT SERPL-MCNC: 6.2 G/DL (ref 6.4–8.2)
RBC # BLD AUTO: 4.37 X10*6/UL (ref 4–5.2)
SODIUM SERPL-SCNC: 139 MMOL/L (ref 136–145)
WBC # BLD AUTO: 8.1 X10*3/UL (ref 4.4–11.3)

## 2025-05-28 PROCEDURE — 2500000001 HC RX 250 WO HCPCS SELF ADMINISTERED DRUGS (ALT 637 FOR MEDICARE OP): Mod: SE

## 2025-05-28 PROCEDURE — 2500000001 HC RX 250 WO HCPCS SELF ADMINISTERED DRUGS (ALT 637 FOR MEDICARE OP): Mod: SE | Performed by: NURSE PRACTITIONER

## 2025-05-28 PROCEDURE — 80053 COMPREHEN METABOLIC PANEL: CPT

## 2025-05-28 PROCEDURE — 85027 COMPLETE CBC AUTOMATED: CPT

## 2025-05-28 PROCEDURE — 62273 INJECT EPIDURAL PATCH: CPT

## 2025-05-28 PROCEDURE — 2500000004 HC RX 250 GENERAL PHARMACY W/ HCPCS (ALT 636 FOR OP/ED): Mod: JZ,SE | Performed by: NURSE PRACTITIONER

## 2025-05-28 PROCEDURE — 36415 COLL VENOUS BLD VENIPUNCTURE: CPT

## 2025-05-28 PROCEDURE — 1210000001 HC SEMI-PRIVATE ROOM DAILY

## 2025-05-28 PROCEDURE — 2500000004 HC RX 250 GENERAL PHARMACY W/ HCPCS (ALT 636 FOR OP/ED): Mod: JZ,SE

## 2025-05-28 RX ADMIN — IBUPROFEN 600 MG: 600 TABLET ORAL at 04:03

## 2025-05-28 RX ADMIN — ENOXAPARIN SODIUM 40 MG: 100 INJECTION SUBCUTANEOUS at 04:03

## 2025-05-28 RX ADMIN — CYCLOBENZAPRINE 10 MG: 10 TABLET, FILM COATED ORAL at 21:52

## 2025-05-28 RX ADMIN — CYCLOBENZAPRINE 10 MG: 10 TABLET, FILM COATED ORAL at 08:15

## 2025-05-28 RX ADMIN — IRON SUCROSE 200 MG: 20 INJECTION, SOLUTION INTRAVENOUS at 21:52

## 2025-05-28 RX ADMIN — CYCLOBENZAPRINE 10 MG: 10 TABLET, FILM COATED ORAL at 15:10

## 2025-05-28 RX ADMIN — ACETAMINOPHEN 975 MG: 325 TABLET ORAL at 10:13

## 2025-05-28 RX ADMIN — ACETAMINOPHEN 975 MG: 325 TABLET ORAL at 16:58

## 2025-05-28 RX ADMIN — ACETAMINOPHEN 975 MG: 325 TABLET ORAL at 04:03

## 2025-05-28 RX ADMIN — IBUPROFEN 600 MG: 600 TABLET ORAL at 10:13

## 2025-05-28 RX ADMIN — IBUPROFEN 600 MG: 600 TABLET ORAL at 16:58

## 2025-05-28 ASSESSMENT — PAIN SCALES - GENERAL
PAINLEVEL_OUTOF10: 3
PAINLEVEL_OUTOF10: 0 - NO PAIN
PAINLEVEL_OUTOF10: 0 - NO PAIN

## 2025-05-28 ASSESSMENT — PAIN - FUNCTIONAL ASSESSMENT: PAIN_FUNCTIONAL_ASSESSMENT: 0-10

## 2025-05-28 NOTE — LACTATION NOTE
Lactation Consultant Note  Lactation Consultation  Reason for Consult: Follow-up assessment, NICU baby, Multiple gestation  Consultant Name: Manas Carrillo RN, IBCLC    Maternal Information  Has mother  before?: No  Infant to breast within first 2 hours of birth?: No  Breastfeeding Delayed Due to: Infant status    Maternal Assessment  Breast Assessment: Medium, Filling, Symmetrical  Nipple Assessment: Intact, Erect  [place] Assessment: Normal    Infant Assessment  Infant Behavior:  (twin infants in NICU)    Feeding Assessment  Nutrition Source: Breastmilk, Donor human milk  Feeding Method: Feeding expressed breastmilk    LATCH TOOL       Breast Pump  Pump: Hospital grade electric pump  Frequency: 8-10 times per day  Duration:  (was using initiate phase, encouraged to now use maintain phase r/t increasing volumes of expressed breastmilk)    Other OB Lactation Tools       Patient Follow-up  Inpatient Lactation Follow-up Needed : Yes  Outpatient Lactation Follow-up: Recommended    Other OB Lactation Documentation  Maternal Risk Factors:  delivery,  delivery <37 weeks  Infant Risk Factors: Prematurity <37 weeks, Prelacteal feeds    Recommendations/Summary  Upon entering the room, mother just finishing pumping on initiate phase over 2 ounces of expressed breast milk. Mother states she noticed today breast changes and increasing volume of expressed breast milk. Encouraged mother to now use maintain phase on pump and pump for at least 15 minutes and up to 30 minutes or until breasts are soft/empty.     Mother states she feels now around the 3 hour adrianne that breasts are filling and getting hard. Encouraged mother to pump with a goal of 8-10 times in a 24 hours period and not go more than 4 hours between pump sessions. Reviewed proper cleaning of pump parts and taught how to use nursing bra to be hands free when pumping.

## 2025-05-28 NOTE — PROGRESS NOTES
Postpartum Progress Note    Assessment/Plan   Suly Olson is a 20 y.o., , who delivered at 34w0d gestation    Now PPD#3 s/p      Whitney, aOMisbahBoaudie [[id number]]   , Low Transverse      Muldoon, bTWOMayaBoy [[id number]]   , Low Transverse on      Muldoon, aONEMayaBoy [[id number]]   2025      Muldoon, bTWOMayaBoy [[id number]]   2025  - Continue routine postpartum care  - Pain well controlled on po medications  - DVT risk score DVT Score (IF A SCORE IS NOT CALCULATING, MUST SELECT A BMI TO COMPLETE): 8 , ppx with SCDs, ambulation, and lovenox  - RH positive, rhogam not indicated  - Hgb:   Results from last 7 days   Lab Units 25  0219 25  2242 25  1240   HEMOGLOBIN g/dL 10.6* 8.2* 7.0*      gHTN  - dx by mild range Bps >4 hrs apart  - HELLP labs neg x 1  - no meds  - asymptomatic other than spinal HA  - normotensive PPD3  - continue to monitor  - needs BP cuff for home    Acute Blood Loss Anemia  - EBL 800cc  - Hgb as above  - s/p 1 unit PRBC's, post transfusion Hgb 8.2  - asymptomatic, VSS  - accepts Venofer x 3 days  - plan PO iron at discharge  - continue to monitor     Headache in s/o wet tap during CSE, like spinal HA  - Normotensive  - Minimal relief with oral medications and conservative therapy  - Positional, worsens with standing, resolved when lying flat  - Evaluated by anesthesia, desires blood patch today but since given Lovenox at 0400, pt will be able to receive it after 4 pm today  - continue to monitor     Depression  - No meds  - Mood stable has great support system  - Discussed increased risk for PPD, agreeable to OB behavioral health referral on discharge    Maternal Well-Being  - Vitals stable  - All questions and concerns address     Feeding  - Breastfeeding/pumping encouraged  - Lactation consult prn    Contraception  - none  - same sex relationship    Dispo  - Anticipate d/c on PPD #4 if pain resolved and meeting all postop/postpartum  milestones  - Follow-up in 2-5 days for BP check  - Follow-up in 1-2wks for incision check  - Follow-up in 4-6wks with primary NIDIA Hill-CNP     Assessment & Plan  Indication for care or intervention in labor or delivery ([facility])    33 weeks gestation of pregnancy ([facility])    Monochorionic diamniotic twin gestation in second trimester ([facility])    Fetal growth restriction antepartum ([facility])    Anemia    Gestational hypertension, antepartum ([facility])    Pregnancy Problems (from 25 to present)       Problem Noted Diagnosed Resolved    Indication for care or intervention in labor or delivery ([facility]) 2025 by Do Mahmood MD  No    Priority:  Medium       Fetal growth restriction antepartum ([facility]) 2025 by Camille Neumann MD  No    Priority:  Medium       Overview Addendum 2025  4:00 PM by Camille Neumann MD   25: FGR of Twin A with normal dopplers. Normal growth of Twin B, though elevated dopplers   [x] twice weekly  testing   [] delivery at 34 weeks         33 weeks gestation of pregnancy ([facility]) 2025 by Camille Neumann MD  No    Priority:  Medium       Overview Addendum 2025  3:53 PM by Camille Neumann MD   Desired provider in labor: [] CNM  [x] Physician  [x] Initial BMI: cannot be calculated   [x] Prenatal Labs: WNL, in media tab  [x] Rh status: positive  [x] Genetic Screening:  risk reducing Tab cfDNA  [x] Pregnancy dated by: first trimester US, report/images not available     [x] Anatomy US: incomplete on   [x] 1hr GCT at 24-28wks: elevated 1 hr, normal 3 hr  [x] Fetal Surveillance (if indicated): weekly at 32 wks  [x] Tdap (27-36wks): given      [x] Breastfeeding: breast pump Rx given  [x] Postpartum Birth control method: currently in same sex relationship & undecided on birth control  [x] GBS at 36 - 37 wks: collected   [x] 39 weeks discussion of IOL vs. Expectant management: 34 weeks  [x] Mode of delivery ( anticipated ): desires primary              Monochorionic diamniotic twin gestation in second trimester ([facility]) 2025 by Camille Neumann MD  No    Priority:  Medium       Overview Addendum 2025  3:53 PM by Camille Neumann MD   [x] fetal echos - LOC 0 x2  [x] genetics - rr cfDNA  [x] q2 week TTTS screening starting at 16 weeks   [] delivery 34-36wks              Depression affecting pregnancy in second trimester, antepartum ([facility]) 2025 by Camille Neumann MD  No    Priority:  Medium             Subjective     Suly [place] is PPD#3 s/p  who reports feeling overall well except for persistent headache.  Headache continues to worsen with sitting up and improves when lying flat. Desires blood patch today but unable to receive it until after 4 pm. No acute events overnight.  Voiding spontaneously, passing flatus.  Pain moderately controlled on PO meds.  Light lochia. Tolerating diet.  Denies N/V, RUQ pain, vision changes, chest pain, or SOB. Denies dizziness/lightheadedness/LOC/uncontrolled bleeding.. Encouraged ambulation in halls and increased hydration after blood patch.     Discussed staying one more night for pain management and to continue to work on meeting post op milestones since activity has been limited due to headache. Pt agreeable to plan. Much emotional support offered.    Objective   Allergies:   Patient has no known allergies.         Last Vitals:  Temp Pulse Resp BP MAP Pulse Ox   36.2 °C (97.2 °F) 108 18 121/80   98 %     Vitals Min/Max Last 24 Hours:  Temp  Min: 36.2 °C (97.2 °F)  Max: 36.6 °C (97.9 °F)  Pulse  Min: 57  Max: 108  Resp  Min: 16  Max: 18  BP  Min: 106/72  Max: 149/93    Intake/Output:   No intake or output data in the 24 hours ending 25 1217    Physical Exam:  General: examination reveals a well developed, well nourished, female, in no acute distress. She is alert and cooperative.  Lungs: breathing even and unlabored, lungs clear all fields  Cardiac: warm and well perfused, heart rate  regular, no murmur  Fundus: firm and below umbilicus, lochia light  Abdominal: soft, appropriately tender, non-distended, bowel sounds active  Extremities: no redness or tenderness in the calves or thighs, edema: not present  Neurological: alert, oriented, normal speech, no focal findings or movement disorder noted.  Psychological: awake and alert; oriented to person, place, and time.  Skin: incision clean, dry, intact, well approximated, no redness, drainage, or warmth     Lab Data:  0   Lab Value Date/Time    GRPBSTREP SEE NOTE 05/14/2025 1536     Lab Results   Component Value Date    GLUCOSE 76 05/28/2025     05/28/2025    K 3.8 05/28/2025     05/28/2025    CO2 22 05/28/2025    ANIONGAP 16 05/28/2025    BUN 10 05/28/2025    CREATININE 0.58 05/28/2025    EGFR >90 05/28/2025    CALCIUM 9.3 05/28/2025    ALBUMIN 3.4 05/28/2025    PROT 6.2 (L) 05/28/2025    ALKPHOS 218 (H) 05/28/2025    ALT 6 (L) 05/28/2025    AST 15 05/28/2025    BILITOT 0.3 05/28/2025

## 2025-05-28 NOTE — ANESTHESIA PROCEDURE NOTES
Blood Patch:  Patient location during procedure: OB/L&D  Start time: 5/28/2025 9:52 AM  Reason for Blood Patch: postdural puncture headache  Staffing  Performed: CRNA and attending   Authorized by: Deb Billy MD    Performed by: NIDIA Schneider-CRNA    Preanesthetic Checklist  Completed: patient identified, IV checked, site marked, risks and benefits discussed, surgical consent, pre-op evaluation, timeout performed and anesthesia consent given  Block Timeout  RN/Licensed healthcare professional reads aloud to the Anesthesia provider and entire team: Patient identity, procedure with side and site, patient position, and as applicable the availability of implants/special equipment/special requirements.  Patient on coagulant treatment: no  Timeout performed at: 5/28/2025 9:53 AM  Epidural  Patient position: sitting  Prep: Chloraprep  Sterility prep: cap, drape, gloves, hand and mask  Patient monitoring: blood pressure monitoring, continuous pulse oximetry, heart rate and blood pressure  Approach: midline  Local numbing: lidocaine 1% to skin and subcutaneous tissues  Loss of resistance technique: RUT saline  Guidance: landmarks    Needle type: Tuohy   Needle gauge: 17  Needle length: 10.2 cm cm  Blood Patch    Injection method: epidural needle    Assessment   Procedure assessment: patient tolerated procedure well with no immediate complications and patient in supine position for 30-60 minutes after procedure

## 2025-05-28 NOTE — PROGRESS NOTES
05/28/25 1053   Discharge Planning   Home or Post Acute Services   (Blood Pressure Monitor)     Patient meets criteria for home monitoring of blood pressure post discharge.  Reason:  current gestational hypertension. Met with patient to assess for availability of home BP monitor.   Patient does not have access to BP monitor at home.  Pt agreed to order home BP monitor from [employer].   Large BP monitor delivered to room. Patient educated on importance of continuing to monitor BP at home, recording BP on home monitoring log and s/sx of when to call her provider.  Pt verbalized understanding the above information.

## 2025-05-28 NOTE — ANESTHESIA PREPROCEDURE EVALUATION
Patient: Suly Olson    Procedure Information    Date: 05/28/25  Procedure: Epidural Blood Patch         Relevant Problems   Anesthesia (within normal limits)      Cardiac (within normal limits)      Pulmonary (within normal limits)      Neuro   (+) Depression affecting pregnancy in second trimester, antepartum ([facility])      /Renal (within normal limits)      Liver (within normal limits)      Hematology   (+) Anemia      Musculoskeletal (within normal limits)      HEENT (within normal limits)      GYN   (+) 33 weeks gestation of pregnancy ([facility])   (+) Monochorionic diamniotic twin gestation in second trimester ([facility])       Clinical information reviewed:   Tobacco  Allergies  Meds  Problems  Med Hx  Surg Hx   Fam Hx  Soc   Hx        NPO Detail:  No data recorded     Physical Exam    Airway  Mallampati: I  TM distance: >3 FB  Neck ROM: full  Mouth opening: 3 or more finger widths     Cardiovascular    Dental        Pulmonary    Abdominal            Anesthesia Plan    History of general anesthesia?: no  History of complications of general anesthesia?: no    ASA 2     epidural     Anesthetic plan and risks discussed with patient.  Use of blood products discussed with patient who.    Plan discussed with CRNA and attending.

## 2025-05-29 ENCOUNTER — ANESTHESIA EVENT (OUTPATIENT)
Dept: OBSTETRICS AND GYNECOLOGY | Facility: HOSPITAL | Age: 20
End: 2025-05-29
Payer: MEDICAID

## 2025-05-29 ENCOUNTER — PHARMACY VISIT (OUTPATIENT)
Dept: PHARMACY | Facility: CLINIC | Age: 20
End: 2025-05-29
Payer: MEDICAID

## 2025-05-29 ENCOUNTER — ANESTHESIA (OUTPATIENT)
Dept: OBSTETRICS AND GYNECOLOGY | Facility: HOSPITAL | Age: 20
End: 2025-05-29
Payer: MEDICAID

## 2025-05-29 VITALS
WEIGHT: 177.69 LBS | DIASTOLIC BLOOD PRESSURE: 92 MMHG | BODY MASS INDEX: 33.55 KG/M2 | TEMPERATURE: 97.9 F | HEIGHT: 61 IN | SYSTOLIC BLOOD PRESSURE: 127 MMHG | HEART RATE: 77 BPM | RESPIRATION RATE: 16 BRPM | OXYGEN SATURATION: 100 %

## 2025-05-29 PROCEDURE — 2500000001 HC RX 250 WO HCPCS SELF ADMINISTERED DRUGS (ALT 637 FOR MEDICARE OP): Mod: SE | Performed by: NURSE PRACTITIONER

## 2025-05-29 PROCEDURE — A62273 PR INJ,LUMB EPIDUR,BLOOD/CLOT PATCH: Performed by: ANESTHESIOLOGY

## 2025-05-29 PROCEDURE — A62273 PR INJ,LUMB EPIDUR,BLOOD/CLOT PATCH: Performed by: ANESTHESIOLOGIST ASSISTANT

## 2025-05-29 PROCEDURE — RXMED WILLOW AMBULATORY MEDICATION CHARGE

## 2025-05-29 PROCEDURE — 2500000004 HC RX 250 GENERAL PHARMACY W/ HCPCS (ALT 636 FOR OP/ED): Mod: JZ,SE | Performed by: NURSE PRACTITIONER

## 2025-05-29 PROCEDURE — 62273 INJECT EPIDURAL PATCH: CPT | Performed by: ANESTHESIOLOGIST ASSISTANT

## 2025-05-29 PROCEDURE — 99238 HOSP IP/OBS DSCHRG MGMT 30/<: CPT | Performed by: STUDENT IN AN ORGANIZED HEALTH CARE EDUCATION/TRAINING PROGRAM

## 2025-05-29 RX ORDER — CYCLOBENZAPRINE HCL 10 MG
10 TABLET ORAL 3 TIMES DAILY
Qty: 18 TABLET | Refills: 0 | Status: SHIPPED | OUTPATIENT
Start: 2025-05-29 | End: 2025-06-04

## 2025-05-29 RX ORDER — IBUPROFEN 600 MG/1
600 TABLET, FILM COATED ORAL EVERY 6 HOURS PRN
Qty: 60 TABLET | Refills: 0 | Status: SHIPPED | OUTPATIENT
Start: 2025-05-29

## 2025-05-29 RX ORDER — POLYETHYLENE GLYCOL 3350 17 G/17G
17 POWDER, FOR SOLUTION ORAL DAILY PRN
Qty: 238 G | Refills: 0 | Status: SHIPPED | OUTPATIENT
Start: 2025-05-29

## 2025-05-29 RX ORDER — SIMETHICONE 80 MG
80 TABLET,CHEWABLE ORAL 4 TIMES DAILY
Status: DISCONTINUED | OUTPATIENT
Start: 2025-05-29 | End: 2025-05-30 | Stop reason: HOSPADM

## 2025-05-29 RX ORDER — POLYETHYLENE GLYCOL 3350 17 G/17G
17 POWDER, FOR SOLUTION ORAL DAILY
Status: DISCONTINUED | OUTPATIENT
Start: 2025-05-29 | End: 2025-05-30 | Stop reason: HOSPADM

## 2025-05-29 RX ORDER — ACETAMINOPHEN 325 MG/1
975 TABLET ORAL EVERY 6 HOURS PRN
Qty: 180 TABLET | Refills: 0 | Status: SHIPPED | OUTPATIENT
Start: 2025-05-29

## 2025-05-29 RX ADMIN — IBUPROFEN 600 MG: 600 TABLET ORAL at 00:04

## 2025-05-29 RX ADMIN — ACETAMINOPHEN 975 MG: 325 TABLET ORAL at 05:59

## 2025-05-29 RX ADMIN — CYCLOBENZAPRINE 10 MG: 10 TABLET, FILM COATED ORAL at 11:12

## 2025-05-29 RX ADMIN — ACETAMINOPHEN 975 MG: 325 TABLET ORAL at 18:00

## 2025-05-29 RX ADMIN — IBUPROFEN 600 MG: 600 TABLET ORAL at 18:01

## 2025-05-29 RX ADMIN — IBUPROFEN 600 MG: 600 TABLET ORAL at 12:16

## 2025-05-29 RX ADMIN — ACETAMINOPHEN 975 MG: 325 TABLET ORAL at 12:17

## 2025-05-29 RX ADMIN — ENOXAPARIN SODIUM 40 MG: 100 INJECTION SUBCUTANEOUS at 04:14

## 2025-05-29 RX ADMIN — ACETAMINOPHEN 975 MG: 325 TABLET ORAL at 00:04

## 2025-05-29 RX ADMIN — IBUPROFEN 600 MG: 600 TABLET ORAL at 05:59

## 2025-05-29 ASSESSMENT — PAIN SCALES - GENERAL
PAINLEVEL_OUTOF10: 2
PAIN_LEVEL: 2
PAIN_LEVEL: 2
PAINLEVEL_OUTOF10: 4
PAINLEVEL_OUTOF10: 2
PAINLEVEL_OUTOF10: 6
PAINLEVEL_OUTOF10: 4

## 2025-05-29 ASSESSMENT — PAIN DESCRIPTION - DESCRIPTORS
DESCRIPTORS: ACHING
DESCRIPTORS: SORE
DESCRIPTORS: ACHING
DESCRIPTORS: ACHING;SORE

## 2025-05-29 ASSESSMENT — PAIN DESCRIPTION - LOCATION
LOCATION: OTHER (COMMENT)
LOCATION: ABDOMEN

## 2025-05-29 ASSESSMENT — PAIN - FUNCTIONAL ASSESSMENT
PAIN_FUNCTIONAL_ASSESSMENT: 0-10
PAIN_FUNCTIONAL_ASSESSMENT: 0-10

## 2025-05-29 NOTE — ANESTHESIA POSTPROCEDURE EVALUATION
Patient: Suly Olson    Procedure Summary       Date: 05/29/25 Room / Location:     Anesthesia Start: 1635 Anesthesia Stop: 1730    Procedure: Epidural Blood Patch Diagnosis:     Scheduled Providers:  Responsible Provider: Rogerio Edward MD    Anesthesia Type: Not recorded ASA Status: 2            Anesthesia Type: No value filed.        Anesthesia Post Evaluation    Patient location during evaluation: bedside  Patient participation: complete - patient participated  Level of consciousness: awake  Pain score: 2  Pain management: adequate  Airway patency: patent  Cardiovascular status: acceptable  Respiratory status: acceptable  Hydration status: acceptable  Postoperative Nausea and Vomiting: none        There were no known notable events for this encounter.

## 2025-05-29 NOTE — DISCHARGE SUMMARY
Postpartum Discharge Summary    Admission Date: 2025  Discharge Date: 25     Discharge Diagnosis  Problem List Items Addressed This Visit       Gestational hypertension, antepartum ([facility])    Overview   Dx 25  - dx by mild range Bps >4 hrs apart  - HELLP labs neg x 1  - no meds  - asymptomatic other than spinal HA          Other Visit Diagnoses          delivery delivered ([facility])    -  Primary    Relevant Medications    acetaminophen (Tylenol) 325 mg tablet    ibuprofen 600 mg tablet    polyethylene glycol (Glycolax, Miralax) 17 gram packet      Spinal and epidural anesthesia-induced headache during labor and delivery ([facility])        Relevant Medications    cyclobenzaprine (Flexeril) 10 mg tablet             Suly Olson is a 20 y.o. female now  and postpartum day 4      Pregnancy notable for:  - Mo-di twin pregnancy: planned to be delivered at 34 wks  - FGR of Twin A  - Depression  - Anemia 9.8, T&C x1   - Abnormal 1 hr 152, nml 3 hr GTT    Hospital Course  Admitted for PTL -> delivered at 34w0d  Delivery Date:      Whitney, aONEMayaBoy [[id number]]   2025      Boon, bTWOMayaBoy [[id number]]   2025      Boon, aONEMayaBoy [[id number]]   12:27 PM      Boon, bTWOMayaBoy [[id number]]   12:29 PM  Delivery type:      Boon, aONEMayaBoy [[id number]]   , Low Transverse      Whitney, bTWOMayaBoy [[id number]]   , Low Transverse   GA at delivery: 34w0d  Outcome:      Boon, aONEMayaBoy [[id number]]   Living      Boon, bTWOMayaBoy [[id number]]   Living  Anesthesia during delivery:      Whitney, aONEMayaBoy [[id number]]   Combined spinal-epidural      Boon, bTWOMayaBoy [[id number]]   Combined spinal-epidural  Intrapartum complications:      Whitney, aONEMayaBoy [[id number]]   None      Boon, bTWOMayaBoy [[id number]]   None  Feeding method: Breastfeeding Status: Yes     Delivery complications: wet tap on spinal HA   Contraception at discharge: in same sex  relationship    Complications Requiring Follow-Up  gHTN  - dx by mild range Bps >4 hrs apart  - HELLP labs neg x 1  - no meds  - asymptomatic other than spinal HA  - normotensive PPD4  - needs BP cuff for home     Acute Blood Loss Anemia  - EBL 800cc  - Hgb most recently 10.6  - s/p 1 unit PRBCs + venofer x 2  - asymptomatic, VSS     Headache in s/o wet tap during CSE, like spinal HA  - s/p epidural blood patch with some relief  - will send home with scheduled Flexeril     Depression  - No meds  - Mood stable has great support system  - Discussed increased risk for PPD, agreeable to OB behavioral health referral on discharge       Pertinent Subjective and Physical Exam At Time of Discharge  Patient seen at bedside - doing well and no acute events overnight. Pain well-controlled on current regimen, lochia light, voiding spontaneously, passing flatus, ambulating independently, and tolerating PO.     Vitals:    05/29/25 0821   BP: 131/87   Pulse: 64   Resp: 18   Temp: 36.2 °C (97.2 °F)   SpO2: 96%       General: well appearing, well-nourished, postpartum  Obstetric: abdomen soft/non-tender, fundus firm below umbilicus, lochia light, Pfannenstiel incision c/d/i  Skin: No rashes/lesions/erythema  Neuro: A/Ox3, conversational  GI: +BS, +flatus  Respiratory: Even and unlabored on RA, LSCTA BL  Cardiovascular: RRR, normal S1, S2  Extremities: No edema, discoloration, or pain in BLE, equal and palpable DP and PT pulses    Psych: appropriate mood and affect     Discharge Meds     Your medication list        START taking these medications        Instructions Last Dose Given Next Dose Due   cyclobenzaprine 10 mg tablet  Commonly known as: Flexeril      Take 1 tablet (10 mg) by mouth 3 times a day for 6 days.       ibuprofen 600 mg tablet      Take 1 tablet (600 mg) by mouth every 6 hours if needed for mild pain (1 - 3) or moderate pain (4 - 6).       polyethylene glycol 17 gram packet  Commonly known as: Glycolax, Miralax       Take 17 g by mouth once daily as needed (constipation).              CHANGE how you take these medications        Instructions Last Dose Given Next Dose Due   acetaminophen 325 mg tablet  Commonly known as: Tylenol  What changed:   medication strength  how much to take  reasons to take this      Take 3 tablets (975 mg) by mouth every 6 hours if needed for mild pain (1 - 3) or moderate pain (4 - 6).              CONTINUE taking these medications        Instructions Last Dose Given Next Dose Due   ferrous sulfate 325 mg (65 mg elemental) tablet      Take 1 tablet (325 mg) by mouth once daily.       prenatal vitamin (iron-folic) 27 mg iron-800 mcg folic acid tablet                     Where to Get Your Medications        These medications were sent to [facility]  [address] [place] [address]      Hours: 8:30 AM to 5 PM Mon-Fri Phone: [phone]   acetaminophen 325 mg tablet  cyclobenzaprine 10 mg tablet  ibuprofen 600 mg tablet  polyethylene glycol 17 gram packet          Test Results Pending At Discharge  Pending Labs       Order Current Status    POCT UA (nonautomated) manually resulted In process    Surgical Pathology Exam - PLACENTA WITH GENETICS (TWINS) In process            Outpatient Follow-Up  Future Appointments   Date Time Provider Department Center   6/9/2025 10:00 AM [facility] [place] [place] [id number] OBGYN NURSE RESOURCE [id number] [facility]   6/26/2025 10:00 AM Bebo Ortiz MD [id number] [facility]     Patient instructed to call to schedule  2-5 days for BP check with primary OB.    I spent 20 minutes in the professional and overall care of this patient.      Hannah Ovalle PA-C  05/29/25 10:45 AM  [place]

## 2025-05-29 NOTE — ANESTHESIA PREPROCEDURE EVALUATION
Patient: Suly Olson    Procedure Information    Date: 05/28/25  Procedure: Epidural Blood Patch         Relevant Problems   Anesthesia (within normal limits)      Cardiac (within normal limits)      Pulmonary (within normal limits)      Neuro   (+) Depression affecting pregnancy in second trimester, antepartum ([facility])      /Renal (within normal limits)      Liver (within normal limits)      Hematology   (+) Anemia      Musculoskeletal (within normal limits)      HEENT (within normal limits)      GYN   (+) 33 weeks gestation of pregnancy ([facility])   (+) Monochorionic diamniotic twin gestation in second trimester ([facility])       Clinical information reviewed:   Tobacco  Allergies  Meds  Problems  Med Hx  Surg Hx   Fam Hx  Soc   Hx        NPO Detail:  No data recorded     Physical Exam    Airway  Mallampati: I  TM distance: >3 FB  Neck ROM: full  Mouth opening: 3 or more finger widths     Cardiovascular    Dental        Pulmonary    Abdominal            Anesthesia Plan    History of general anesthesia?: no  History of complications of general anesthesia?: no    ASA 2     epidural     Anesthetic plan and risks discussed with patient.  Use of blood products discussed with patient who.    Plan discussed with CRNA and attending.    Patient: Suly Olson    Procedure Information    Anesthesia Start Date/Time: 05/29/25 1635  Procedure: Epidural Blood Patch         Relevant Problems   Anesthesia (within normal limits)      Neuro   (+) Depression affecting pregnancy in second trimester, antepartum ([facility])      Hematology   (+) Anemia      GYN   (+) 33 weeks gestation of pregnancy ([facility])   (+) Monochorionic diamniotic twin gestation in second trimester ([facility])       Clinical information reviewed:   Tobacco  Allergies  Meds  Problems  Med Hx  Surg Hx   Fam Hx  Soc   Hx        NPO Detail:  No data recorded     PHYSICAL EXAM    Anesthesia Plan    History of general anesthesia?: no  History of  complications of general anesthesia?: unknown/emergency    ASA 2     Anesthetic plan and risks discussed with patient.  Use of blood products discussed with patient who consented to blood products.

## 2025-05-29 NOTE — PROGRESS NOTES
Postpartum Progress Note    Assessment/Plan   Suly Olson is a 20 y.o., , who was admitted for PTL and ultimately delivery in s/o mo/di twins, delivered at 34w0d gestation and is now postpartum day 4 s/p      Whitney Ebonie [[id number]]   , Low Transverse      Whitney bTWOMayaJackaudie [[id number]]   , Low Transverse.     Routine postpartum care  - meeting all postpartum and post-op milestones  - voiding spontaneously, passing flatus, ambulating, tolerating PO diet  - bonding with infants in NICU  - pain well controlled on po medications  - DVT Score (IF A SCORE IS NOT CALCULATING, MUST SELECT A BMI TO COMPLETE): 8 - encourage ambulation,  SCDs, and  ppx lovenox  - A+, Rhogam not indicated  - PPBC: none, same-sex relationship    gHTN  - dx by mild range Bps >4 hrs apart  - HELLP labs neg x 1  - no meds  - asymptomatic other than spinal HA  - normotensive PPD4  - continue to monitor  - needs BP cuff for home     Acute Blood Loss Anemia  - EBL 800cc  - Hgb most recently 10.6  - s/p 1 unit PRBCs  - asymptomatic, VSS  - accepts Venofer x 3 days  - continue to monitor     Headache in s/o wet tap during CSE, like spinal HA  - Normotensive  - Minimal relief with oral medications and conservative therapy  - Positional, worsens with standing, resolved when lying flat  - Evaluated by anesthesia, desires blood patch today but since given Lovenox at 0400, pt will be able to receive it after 4 pm today  - continue to monitor     Depression  - No meds  - Mood stable has great support system  - Discussed increased risk for PPD, agreeable to OB behavioral health referral on discharge    Maternal Well-Being  - emotional support provided    [place] Feeding  - breastfeeding/pumping encouraged; lactation consult prn    Dispo:  anticipate d/c on POD #4 if BP well-controlled and meeting all postpartum milestones, for f/u 1 week for BP check, 2 weeks for incision check, and 1 month with Primary OB provider; will  plan for DC following blood patch this afternoon    BELINDA Buchanan    Assessment & Plan  Indication for care or intervention in labor or delivery ([facility])    33 weeks gestation of pregnancy ([facility])    Monochorionic diamniotic twin gestation in second trimester ([facility])    Fetal growth restriction antepartum ([facility])    Anemia    Gestational hypertension, antepartum ([facility])    Pregnancy Problems (from 25 to present)       Problem Noted Diagnosed Resolved    Gestational hypertension, antepartum ([facility]) 2025 by NIDIA Bhandari-CNP  No    Priority:  Medium       Overview Signed 2025 12:29 PM by NIDIA Bhandari-CNP   Dx 25  - dx by mild range Bps >4 hrs apart  - HELLP labs neg x 1  - no meds  - asymptomatic other than spinal HA         Indication for care or intervention in labor or delivery ([facility]) 2025 by Do Mahmood MD  No    Priority:  Medium       Fetal growth restriction antepartum ([facility]) 2025 by Camille Neumann MD  No    Priority:  Medium       Overview Addendum 2025  4:00 PM by Camille Neumann MD   25: FGR of Twin A with normal dopplers. Normal growth of Twin B, though elevated dopplers   [x] twice weekly  testing   [] delivery at 34 weeks         33 weeks gestation of pregnancy ([facility]) 2025 by Camille Neumann MD  No    Priority:  Medium       Overview Addendum 2025  3:53 PM by Camille Neumann MD   Desired provider in labor: [] CNM  [x] Physician  [x] Initial BMI: cannot be calculated   [x] Prenatal Labs: WNL, in media tab  [x] Rh status: positive  [x] Genetic Screening:  risk reducing Tab cfDNA  [x] Pregnancy dated by: first trimester US, report/images not available     [x] Anatomy US: incomplete on   [x] 1hr GCT at 24-28wks: elevated 1 hr, normal 3 hr  [x] Fetal Surveillance (if indicated): weekly at 32 wks  [x] Tdap (27-36wks): given      [x] Breastfeeding: breast pump Rx given  [x] Postpartum Birth control method:  currently in same sex relationship & undecided on birth control  [x] GBS at 36 - 37 wks: collected   [x] 39 weeks discussion of IOL vs. Expectant management: 34 weeks  [x] Mode of delivery ( anticipated ): desires primary             Monochorionic diamniotic twin gestation in second trimester ([facility]) 2025 by Camille Neumann MD  No    Priority:  Medium       Overview Addendum 2025  3:53 PM by Camille Neumann MD   [x] fetal echos - LOC 0 x2  [x] genetics - rr cfDNA  [x] q2 week TTTS screening starting at 16 weeks   [] delivery 34-36wks              Depression affecting pregnancy in second trimester, antepartum ([facility]) 2025 by Camille Neumann MD  No    Priority:  Medium             Subjective   Her pain is well controlled with current medications  She is passing flatus  She is ambulating well  She is tolerating a Adult diet Regular  She reports no breast or nursing problems  She denies emotional concerns today      Still struggling with spinal HA. Was undecided on blood patch this AM but ultimately would like to have procedure done prior to DC. Has relief from HA when lying flat and some relief with Flexeril.    Objective   Allergies:   Patient has no known allergies.         Last Vitals:  Temp Pulse Resp BP MAP Pulse Ox   36.6 °C (97.9 °F) 73 18 111/69   98 %     Vitals Min/Max Last 24 Hours:  Temp  Min: 36.2 °C (97.2 °F)  Max: 36.7 °C (98.1 °F)  Pulse  Min: 59  Max: 77  Resp  Min: 18  Max: 19  BP  Min: 100/65  Max: 131/87    Physical Exam:  General: well appearing, well-nourished, postpartum  Obstetric: abdomen soft/non-tender, fundus firm below umbilicus, lochia light, Pfannenstiel incision c/d/i  Skin: No rashes/lesions/erythema  Neuro: A/Ox3, conversational  GI: +BS, +flatus  Respiratory: Even and unlabored on RA, LSCTA BL  Cardiovascular: RRR, normal S1, S2  Extremities: No edema, discoloration, or pain in BLE, equal and palpable DP and PT pulses    Psych: appropriate mood and affect     Lab  Data:  Lab Results   Component Value Date    WBC 8.1 05/28/2025    HGB 10.6 (L) 05/28/2025    HCT 33.8 (L) 05/28/2025     05/28/2025

## 2025-05-29 NOTE — CARE PLAN
The patient's goals for the shift include Fix headache    The clinical goals for the shift include fundus and lochia will remain WNL    Patient to be discharged soon.

## 2025-05-29 NOTE — ANESTHESIA POSTPROCEDURE EVALUATION
Patient: Suly Olson    Procedure Summary       Date: 05/28/25 Room / Location:     Anesthesia Start:  Anesthesia Stop:     Procedure: Epidural Blood Patch Diagnosis:     Scheduled Providers:  Responsible Provider:     Anesthesia Type: epidural ASA Status: 2            Anesthesia Type: epidural        Anesthesia Post Evaluation    Patient location during evaluation: bedside  Patient participation: complete - patient participated  Level of consciousness: awake  Pain score: 2  Pain management: adequate  Airway patency: patent  Cardiovascular status: acceptable  Respiratory status: acceptable  Hydration status: acceptable  Postoperative Nausea and Vomiting: none        No notable events documented.

## 2025-05-29 NOTE — ANESTHESIA PROCEDURE NOTES
Blood Patch:  Patient location during procedure: OB/L&D  Start time: 5/29/2025 4:35 PM  End time: 5/29/2025 5:30 PM  Reason for Blood Patch: postdural puncture headache  Staffing  Performed: IVETT   Authorized by: IVETT Romero    Performed by: IVETT Romero    Preanesthetic Checklist  Completed: patient identified, IV checked, site marked, risks and benefits discussed, surgical consent, monitors and equipment checked, pre-op evaluation, timeout performed, anesthesia consent given and sterile techniques followed  Block Timeout  RN/Licensed healthcare professional reads aloud to the Anesthesia provider and entire team: Patient identity, procedure with side and site, patient position, and as applicable the availability of implants/special equipment/special requirements.  Patient on coagulant treatment: no  Timeout performed at: 5/29/2025 4:37 PM  Epidural  Patient position: sitting  Prep: Chloraprep  Sterility prep: drape, cap, gloves and mask  Patient monitoring: blood pressure, continuous pulse oximetry and heart rate  Approach: midline  Local numbing: lidocaine 1% to skin and subcutaneous tissues  Location: L2-3  Loss of resistance technique: RUT saline and air  Guidance: landmarks    Needle type: Tuohy   Needle gauge: 17  Needle length: 8.9 cm cm  Needle insertion depth: 5 cm  Blood Patch  Location of venous blood draw: left arm  Injection method: epidural needle  Volume of blood injected: 20 mL  Assessment   Number of attempts: 1  Events: well tolerated  Procedure assessment: patient tolerated procedure well with no immediate complications and patient in supine position for 30-60 minutes after procedure  Postprocedure Assessment  Patient position: sitting up  Headache: improved  Patient will be discharged to home: patient not discharged home  Patient counseled on signs/symptoms for which to call and/or return: yes  Postprocedure instructions for plan/follow-up given to patient: yes  Additional Notes  Pt.  Noticed a marked improvement in her headache when sitting up on her wheelchair and states headache pain is now 2/10

## 2025-05-29 NOTE — CARE PLAN
Problem: Antepartum  Goal: Maintain pregnancy as long as maternal and/or fetal condition is stable  Outcome: Met  Goal: Avoid/minimize constipation  Outcome: Met  Goal: No decrease in circulation/VTE  Outcome: Met  Goal: FHR remains reassuring  Outcome: Met  Goal: Minimize anxiety/maximize coping  Outcome: Met     Problem: Pain - Adult  Goal: Verbalizes/displays adequate comfort level or baseline comfort level  2025 by Flory Mcdowell RN  Outcome: Progressing     Problem: Safety - Adult  Goal: Free from fall injury  2025 by Flory Mcdowell RN  Outcome: Progressing    Problem: Postpartum  Goal: Experiences normal postpartum course  2025 by Flory Mcdowell RN  Outcome: Progressing  Goal: Incisions, wounds, or drain sites healing without S/S of infection  2025 by Flory Mcdowell RN  Outcome: Progressing  Goal: No s/sx infection  2025 by Flory Mcdowell RN  Outcome: Progressing  Goal: No s/sx of hemorrhage  2025 by Flory Mcdowell RN  Outcome: Progressing    Problem: Discharge Planning  Goal: Discharge to home or other facility with appropriate resources  2025 by Flory Mcdowell RN  Outcome: Progressing    Problem:  Recovery Care  Goal: Verbalizes understanding of post-op instructions  Outcome: Progressing  Goal: Manages discomfort  Outcome: Progressing  Goal: Patient vital signs are stable  Outcome: Progressing     The patient's goals for the shift include pump for infants, visit babies in NICU, get some rest     The clinical goals for the shift include VSS, assessments WNL, continue to meet post-op milestones     Over the shift, the patient did make progress toward the following goals.

## 2025-05-30 ENCOUNTER — LACTATION ENCOUNTER (OUTPATIENT)
Dept: OTHER | Facility: HOSPITAL | Age: 20
End: 2025-05-30

## 2025-05-30 NOTE — LACTATION NOTE
This note was copied from a baby's chart.  Lactation Consultant Note  Lactation Consultation   Lisa Doty RN IBCLC    Recommendations/Summary       I spoke with mom at pt's bedside.  Mom is pumping 4 to 5 ounces with each pumping effort.  She was given larger bottles to pump into along with milk storage bags.  Mom has no current questions or concerns at this time.  Invited to contact LC services as needed.

## 2025-06-02 LAB
LABORATORY COMMENT REPORT: NORMAL
PATH REPORT.FINAL DX SPEC: NORMAL
PATH REPORT.GROSS SPEC: NORMAL
PATH REPORT.RELEVANT HX SPEC: NORMAL
PATH REPORT.TOTAL CANCER: NORMAL

## 2025-06-03 ENCOUNTER — LACTATION ENCOUNTER (OUTPATIENT)
Dept: OTHER | Facility: HOSPITAL | Age: 20
End: 2025-06-03

## 2025-06-03 NOTE — LACTATION NOTE
This note was copied from a baby's chart.  Lactation Consultant Note  Lactation Consultation       Maternal Information       Maternal Assessment       Infant Assessment       Feeding Assessment       LATCH TOOL       Breast Pump       Other OB Lactation Tools       Patient Follow-up       Other OB Lactation Documentation       Recommendations/Summary  Spoke to mo on infants bedside and she states she is getting about 6oz/ per session with her pumping efforts. Mom states she has no questions or concerns at this time.

## 2025-06-04 ENCOUNTER — LACTATION ENCOUNTER (OUTPATIENT)
Dept: OTHER | Facility: HOSPITAL | Age: 20
End: 2025-06-04

## 2025-06-04 NOTE — LACTATION NOTE
This note was copied from a baby's chart.  Lactation Consultant Note  Lactation Consultation   Sasha Cm RN, IBCLC    Recommendations/Summary  Met with mom at infant's bedside to follow up with a the nurse's concern for blood tinged milk. Mom states that she has a small sore on one nipple that began bleeding during the beginning of her morning pump session then stopped. That bag of milk has a pink color. Provided information sheet on blood in milk. Assured her that it is ok to use and contacted HOS[facility]IN [facility] to make her aware in case there are any GI abnormalities in the babies.  Mom states that the sore is not bothering her at this time. Encouraged her to use lanolin cream and allow it to be open to air whenever possible for healing.    Also, provided with discharge education packet in anticipation for possible discharge of one baby in the next few days. Reviewed it's contents of: outpatient lactation contact information, signs of adequate breastfeeding and latch, and causes for concern in the baby and mother. Answered any questions. Invited her to reach out to lactation services with any further questions or concerns.

## 2025-06-09 ENCOUNTER — CLINICAL SUPPORT (OUTPATIENT)
Dept: OBSTETRICS AND GYNECOLOGY | Facility: CLINIC | Age: 20
End: 2025-06-09
Payer: COMMERCIAL

## 2025-06-09 VITALS
SYSTOLIC BLOOD PRESSURE: 120 MMHG | WEIGHT: 156.8 LBS | DIASTOLIC BLOOD PRESSURE: 84 MMHG | HEART RATE: 83 BPM | BODY MASS INDEX: 29.64 KG/M2

## 2025-06-09 PROCEDURE — 99211 OFF/OP EST MAY X REQ PHY/QHP: CPT | Performed by: STUDENT IN AN ORGANIZED HEALTH CARE EDUCATION/TRAINING PROGRAM

## 2025-06-09 ASSESSMENT — PAIN SCALES - GENERAL: PAINLEVEL_OUTOF10: 0-NO PAIN

## 2025-06-09 NOTE — PROGRESS NOTES
Pt here for incision check s/p c/s 5/25/25 mono-di twin pregnancy FGR of twin A, gHTN not on medication, , PTL, delivered at 34 wks 1 baby now home 1 in NICU. Incision healing well, no drainage, redness or odor pt denies pain. B/P in office 120/84, checking at home 120's/80's no HA, vision changes RUQ pain. Reviewed symptoms of PEC, B/P range when to present to L&D or contact office Post partum appt scheduled 6/26/25 No questions or concerns.

## (undated) DEVICE — TOWEL PACK, STERILE, 4/PACK, BLUE

## (undated) DEVICE — SUTURE, PDS II, 0, 60 IN, CTX, VIOLET

## (undated) DEVICE — DRAPE PACK, CESAREAN SECTION, CUSTOM, UHC